# Patient Record
Sex: FEMALE | Race: WHITE | Employment: UNEMPLOYED | URBAN - METROPOLITAN AREA
[De-identification: names, ages, dates, MRNs, and addresses within clinical notes are randomized per-mention and may not be internally consistent; named-entity substitution may affect disease eponyms.]

---

## 2017-11-28 ENCOUNTER — LAB ENCOUNTER (OUTPATIENT)
Dept: LAB | Age: 27
End: 2017-11-28
Attending: OBSTETRICS & GYNECOLOGY
Payer: MEDICAID

## 2017-11-28 ENCOUNTER — OFFICE VISIT (OUTPATIENT)
Dept: OBGYN CLINIC | Facility: CLINIC | Age: 27
End: 2017-11-28

## 2017-11-28 ENCOUNTER — APPOINTMENT (OUTPATIENT)
Dept: OBGYN CLINIC | Facility: CLINIC | Age: 27
End: 2017-11-28

## 2017-11-28 VITALS
WEIGHT: 111 LBS | SYSTOLIC BLOOD PRESSURE: 110 MMHG | HEIGHT: 60 IN | DIASTOLIC BLOOD PRESSURE: 50 MMHG | BODY MASS INDEX: 21.79 KG/M2

## 2017-11-28 DIAGNOSIS — Z34.01 ENCOUNTER FOR SUPERVISION OF NORMAL FIRST PREGNANCY IN FIRST TRIMESTER: ICD-10-CM

## 2017-11-28 DIAGNOSIS — O36.80X0 PREGNANCY WITH INCONCLUSIVE FETAL VIABILITY, SINGLE OR UNSPECIFIED FETUS: ICD-10-CM

## 2017-11-28 DIAGNOSIS — Z3A.11 11 WEEKS GESTATION OF PREGNANCY: Primary | ICD-10-CM

## 2017-11-28 DIAGNOSIS — Z34.01 ENCOUNTER FOR SUPERVISION OF NORMAL FIRST PREGNANCY IN FIRST TRIMESTER: Primary | ICD-10-CM

## 2017-11-28 DIAGNOSIS — Z3A.11 11 WEEKS GESTATION OF PREGNANCY: ICD-10-CM

## 2017-11-28 PROCEDURE — 81220 CFTR GENE COM VARIANTS: CPT

## 2017-11-28 PROCEDURE — 81002 URINALYSIS NONAUTO W/O SCOPE: CPT | Performed by: OBSTETRICS & GYNECOLOGY

## 2017-11-28 PROCEDURE — 76801 OB US < 14 WKS SINGLE FETUS: CPT | Performed by: OBSTETRICS & GYNECOLOGY

## 2017-11-28 PROCEDURE — 88175 CYTOPATH C/V AUTO FLUID REDO: CPT | Performed by: OBSTETRICS & GYNECOLOGY

## 2017-11-28 PROCEDURE — 87086 URINE CULTURE/COLONY COUNT: CPT | Performed by: OBSTETRICS & GYNECOLOGY

## 2017-11-28 PROCEDURE — 87340 HEPATITIS B SURFACE AG IA: CPT

## 2017-11-28 PROCEDURE — 86780 TREPONEMA PALLIDUM: CPT

## 2017-11-28 PROCEDURE — 86901 BLOOD TYPING SEROLOGIC RH(D): CPT

## 2017-11-28 PROCEDURE — 85025 COMPLETE CBC W/AUTO DIFF WBC: CPT

## 2017-11-28 PROCEDURE — 87624 HPV HI-RISK TYP POOLED RSLT: CPT | Performed by: OBSTETRICS & GYNECOLOGY

## 2017-11-28 PROCEDURE — 86900 BLOOD TYPING SEROLOGIC ABO: CPT

## 2017-11-28 PROCEDURE — 87389 HIV-1 AG W/HIV-1&-2 AB AG IA: CPT

## 2017-11-28 PROCEDURE — 86762 RUBELLA ANTIBODY: CPT

## 2017-11-28 PROCEDURE — 0500F INITIAL PRENATAL CARE VISIT: CPT | Performed by: OBSTETRICS & GYNECOLOGY

## 2017-11-28 PROCEDURE — 86850 RBC ANTIBODY SCREEN: CPT

## 2017-12-22 ENCOUNTER — OFFICE VISIT (OUTPATIENT)
Dept: OBGYN CLINIC | Facility: CLINIC | Age: 27
End: 2017-12-22

## 2017-12-22 ENCOUNTER — APPOINTMENT (OUTPATIENT)
Dept: OBGYN CLINIC | Facility: CLINIC | Age: 27
End: 2017-12-22

## 2017-12-22 VITALS
BODY MASS INDEX: 22.19 KG/M2 | DIASTOLIC BLOOD PRESSURE: 52 MMHG | HEIGHT: 60 IN | SYSTOLIC BLOOD PRESSURE: 102 MMHG | WEIGHT: 113 LBS

## 2017-12-22 DIAGNOSIS — Z34.02 ENCOUNTER FOR SUPERVISION OF NORMAL FIRST PREGNANCY IN SECOND TRIMESTER: Primary | ICD-10-CM

## 2017-12-22 LAB
AMB EXT MYRIAD TRISOMY 13: NEGATIVE
AMB EXT MYRIAD TRISOMY 18: NEGATIVE
AMB EXT MYRIAD TRISOMY 21: NEGATIVE

## 2017-12-22 PROCEDURE — 0502F SUBSEQUENT PRENATAL CARE: CPT | Performed by: OBSTETRICS & GYNECOLOGY

## 2017-12-22 PROCEDURE — 76805 OB US >/= 14 WKS SNGL FETUS: CPT | Performed by: OBSTETRICS & GYNECOLOGY

## 2018-01-03 ENCOUNTER — TELEPHONE (OUTPATIENT)
Dept: OBGYN CLINIC | Facility: CLINIC | Age: 28
End: 2018-01-03

## 2018-01-18 DIAGNOSIS — Z3A.19 19 WEEKS GESTATION OF PREGNANCY: Primary | ICD-10-CM

## 2018-01-22 ENCOUNTER — APPOINTMENT (OUTPATIENT)
Dept: OBGYN CLINIC | Facility: CLINIC | Age: 28
End: 2018-01-22

## 2018-01-22 ENCOUNTER — TELEPHONE (OUTPATIENT)
Dept: OBGYN CLINIC | Facility: CLINIC | Age: 28
End: 2018-01-22

## 2018-01-22 ENCOUNTER — OFFICE VISIT (OUTPATIENT)
Dept: OBGYN CLINIC | Facility: CLINIC | Age: 28
End: 2018-01-22

## 2018-01-22 VITALS
WEIGHT: 118 LBS | SYSTOLIC BLOOD PRESSURE: 102 MMHG | DIASTOLIC BLOOD PRESSURE: 58 MMHG | HEIGHT: 60 IN | BODY MASS INDEX: 23.16 KG/M2

## 2018-01-22 DIAGNOSIS — Z34.02 ENCOUNTER FOR SUPERVISION OF NORMAL FIRST PREGNANCY IN SECOND TRIMESTER: Primary | ICD-10-CM

## 2018-01-22 PROCEDURE — 0502F SUBSEQUENT PRENATAL CARE: CPT | Performed by: OBSTETRICS & GYNECOLOGY

## 2018-01-22 PROCEDURE — 76805 OB US >/= 14 WKS SNGL FETUS: CPT | Performed by: OBSTETRICS & GYNECOLOGY

## 2018-01-22 NOTE — PROGRESS NOTES
Flu shot done. No diabetes in her family. Suboptimal views of the heart and spine will refer to [facility]. Sugar test in a month.

## 2018-02-20 ENCOUNTER — OFFICE VISIT (OUTPATIENT)
Dept: OBGYN CLINIC | Facility: CLINIC | Age: 28
End: 2018-02-20

## 2018-02-20 VITALS
HEIGHT: 60 IN | BODY MASS INDEX: 23.95 KG/M2 | SYSTOLIC BLOOD PRESSURE: 118 MMHG | DIASTOLIC BLOOD PRESSURE: 62 MMHG | WEIGHT: 122 LBS

## 2018-02-20 DIAGNOSIS — Z34.02 ENCOUNTER FOR SUPERVISION OF NORMAL FIRST PREGNANCY IN SECOND TRIMESTER: Primary | ICD-10-CM

## 2018-02-20 DIAGNOSIS — Z3A.23 23 WEEKS GESTATION OF PREGNANCY: ICD-10-CM

## 2018-02-20 PROCEDURE — 0502F SUBSEQUENT PRENATAL CARE: CPT | Performed by: OBSTETRICS & GYNECOLOGY

## 2018-02-20 NOTE — PROGRESS NOTES
01/29/2018 US with MFM   GA per US = 19.9 weeks   NF= 2.21 mm   CL = 3.7 cm   FHR = 152 bpm   Normal anatomy   Normal fetal growth   REYNA normal

## 2018-03-01 ENCOUNTER — LAB ENCOUNTER (OUTPATIENT)
Dept: LAB | Age: 28
End: 2018-03-01
Attending: OBSTETRICS & GYNECOLOGY
Payer: MEDICAID

## 2018-03-01 DIAGNOSIS — Z34.02 ENCOUNTER FOR SUPERVISION OF NORMAL FIRST PREGNANCY IN SECOND TRIMESTER: ICD-10-CM

## 2018-03-01 LAB
BASOPHILS # BLD AUTO: 0.03 X10(3) UL (ref 0–0.1)
BASOPHILS NFR BLD AUTO: 0.4 %
EOSINOPHIL # BLD AUTO: 0.04 X10(3) UL (ref 0–0.3)
EOSINOPHIL NFR BLD AUTO: 0.5 %
ERYTHROCYTE [DISTWIDTH] IN BLOOD BY AUTOMATED COUNT: 13.7 % (ref 11.5–16)
GLUCOSE 1H P GLC SERPL-MCNC: 118 MG/DL
HCT VFR BLD AUTO: 33.7 % (ref 34–50)
HGB BLD-MCNC: 10.9 G/DL (ref 12–16)
IMMATURE GRANULOCYTE COUNT: 0.06 X10(3) UL (ref 0–1)
IMMATURE GRANULOCYTE RATIO %: 0.7 %
LYMPHOCYTES # BLD AUTO: 1.71 X10(3) UL (ref 0.9–4)
LYMPHOCYTES NFR BLD AUTO: 20.3 %
MCH RBC QN AUTO: 28.4 PG (ref 27–33.2)
MCHC RBC AUTO-ENTMCNC: 32.3 G/DL (ref 31–37)
MCV RBC AUTO: 87.8 FL (ref 81–100)
MONOCYTES # BLD AUTO: 0.64 X10(3) UL (ref 0.1–1)
MONOCYTES NFR BLD AUTO: 7.6 %
NEUTROPHIL ABS PRELIM: 5.94 X10 (3) UL (ref 1.3–6.7)
NEUTROPHILS # BLD AUTO: 5.94 X10(3) UL (ref 1.3–6.7)
NEUTROPHILS NFR BLD AUTO: 70.5 %
PLATELET # BLD AUTO: 224 10(3)UL (ref 150–450)
RBC # BLD AUTO: 3.84 X10(6)UL (ref 3.8–5.1)
RED CELL DISTRIBUTION WIDTH-SD: 43.7 FL (ref 35.1–46.3)
WBC # BLD AUTO: 8.4 X10(3) UL (ref 4–13)

## 2018-03-01 PROCEDURE — 85025 COMPLETE CBC W/AUTO DIFF WBC: CPT

## 2018-03-01 PROCEDURE — 82950 GLUCOSE TEST: CPT

## 2018-03-20 ENCOUNTER — OFFICE VISIT (OUTPATIENT)
Dept: OBGYN CLINIC | Facility: CLINIC | Age: 28
End: 2018-03-20

## 2018-03-20 VITALS
SYSTOLIC BLOOD PRESSURE: 108 MMHG | HEIGHT: 60 IN | DIASTOLIC BLOOD PRESSURE: 60 MMHG | WEIGHT: 128 LBS | BODY MASS INDEX: 25.13 KG/M2

## 2018-03-20 DIAGNOSIS — Z3A.27 27 WEEKS GESTATION OF PREGNANCY: ICD-10-CM

## 2018-03-20 DIAGNOSIS — Z34.02 ENCOUNTER FOR SUPERVISION OF NORMAL FIRST PREGNANCY IN SECOND TRIMESTER: Primary | ICD-10-CM

## 2018-03-20 PROCEDURE — 0502F SUBSEQUENT PRENATAL CARE: CPT | Performed by: OBSTETRICS & GYNECOLOGY

## 2018-04-03 ENCOUNTER — OFFICE VISIT (OUTPATIENT)
Dept: OBGYN CLINIC | Facility: CLINIC | Age: 28
End: 2018-04-03

## 2018-04-03 ENCOUNTER — APPOINTMENT (OUTPATIENT)
Dept: LAB | Age: 28
End: 2018-04-03
Attending: OBSTETRICS & GYNECOLOGY
Payer: MEDICAID

## 2018-04-03 VITALS
SYSTOLIC BLOOD PRESSURE: 110 MMHG | HEIGHT: 60 IN | WEIGHT: 130 LBS | DIASTOLIC BLOOD PRESSURE: 64 MMHG | BODY MASS INDEX: 25.52 KG/M2

## 2018-04-03 DIAGNOSIS — Z3A.29 29 WEEKS GESTATION OF PREGNANCY: ICD-10-CM

## 2018-04-03 DIAGNOSIS — Z34.03 ENCOUNTER FOR SUPERVISION OF NORMAL FIRST PREGNANCY IN THIRD TRIMESTER: ICD-10-CM

## 2018-04-03 DIAGNOSIS — Z34.03 ENCOUNTER FOR SUPERVISION OF NORMAL FIRST PREGNANCY IN THIRD TRIMESTER: Primary | ICD-10-CM

## 2018-04-03 PROCEDURE — 0502F SUBSEQUENT PRENATAL CARE: CPT | Performed by: OBSTETRICS & GYNECOLOGY

## 2018-04-03 PROCEDURE — 87389 HIV-1 AG W/HIV-1&-2 AB AG IA: CPT | Performed by: OBSTETRICS & GYNECOLOGY

## 2018-04-17 ENCOUNTER — OFFICE VISIT (OUTPATIENT)
Dept: OBGYN CLINIC | Facility: CLINIC | Age: 28
End: 2018-04-17

## 2018-04-17 VITALS
BODY MASS INDEX: 26.11 KG/M2 | WEIGHT: 133 LBS | DIASTOLIC BLOOD PRESSURE: 60 MMHG | SYSTOLIC BLOOD PRESSURE: 102 MMHG | HEIGHT: 60 IN

## 2018-04-17 DIAGNOSIS — Z3A.31 31 WEEKS GESTATION OF PREGNANCY: ICD-10-CM

## 2018-04-17 DIAGNOSIS — Z34.03 ENCOUNTER FOR SUPERVISION OF NORMAL FIRST PREGNANCY IN THIRD TRIMESTER: Primary | ICD-10-CM

## 2018-04-17 PROCEDURE — 0502F SUBSEQUENT PRENATAL CARE: CPT | Performed by: OBSTETRICS & GYNECOLOGY

## 2018-05-01 ENCOUNTER — OFFICE VISIT (OUTPATIENT)
Dept: OBGYN CLINIC | Facility: CLINIC | Age: 28
End: 2018-05-01

## 2018-05-01 VITALS
WEIGHT: 134 LBS | BODY MASS INDEX: 26.31 KG/M2 | HEIGHT: 60 IN | SYSTOLIC BLOOD PRESSURE: 118 MMHG | DIASTOLIC BLOOD PRESSURE: 60 MMHG

## 2018-05-01 DIAGNOSIS — Z3A.33 33 WEEKS GESTATION OF PREGNANCY: ICD-10-CM

## 2018-05-01 DIAGNOSIS — Z34.03 ENCOUNTER FOR SUPERVISION OF NORMAL FIRST PREGNANCY IN THIRD TRIMESTER: Primary | ICD-10-CM

## 2018-05-01 PROCEDURE — 0502F SUBSEQUENT PRENATAL CARE: CPT | Performed by: OBSTETRICS & GYNECOLOGY

## 2018-05-03 ENCOUNTER — TELEPHONE (OUTPATIENT)
Dept: OBGYN CLINIC | Facility: CLINIC | Age: 28
End: 2018-05-03

## 2018-05-04 ENCOUNTER — APPOINTMENT (OUTPATIENT)
Dept: OBGYN CLINIC | Facility: CLINIC | Age: 28
End: 2018-05-04

## 2018-05-22 ENCOUNTER — OFFICE VISIT (OUTPATIENT)
Dept: OBGYN CLINIC | Facility: CLINIC | Age: 28
End: 2018-05-22

## 2018-05-22 VITALS
BODY MASS INDEX: 27.68 KG/M2 | DIASTOLIC BLOOD PRESSURE: 60 MMHG | HEIGHT: 60 IN | SYSTOLIC BLOOD PRESSURE: 108 MMHG | WEIGHT: 141 LBS

## 2018-05-22 DIAGNOSIS — Z3A.36 36 WEEKS GESTATION OF PREGNANCY: ICD-10-CM

## 2018-05-22 DIAGNOSIS — Z34.03 ENCOUNTER FOR SUPERVISION OF NORMAL FIRST PREGNANCY IN THIRD TRIMESTER: Primary | ICD-10-CM

## 2018-05-22 PROCEDURE — 87081 CULTURE SCREEN ONLY: CPT | Performed by: OBSTETRICS & GYNECOLOGY

## 2018-05-22 PROCEDURE — 87653 STREP B DNA AMP PROBE: CPT | Performed by: OBSTETRICS & GYNECOLOGY

## 2018-05-22 PROCEDURE — 0502F SUBSEQUENT PRENATAL CARE: CPT | Performed by: OBSTETRICS & GYNECOLOGY

## 2018-06-01 ENCOUNTER — OFFICE VISIT (OUTPATIENT)
Dept: OBGYN CLINIC | Facility: CLINIC | Age: 28
End: 2018-06-01

## 2018-06-01 VITALS
SYSTOLIC BLOOD PRESSURE: 112 MMHG | HEIGHT: 60 IN | BODY MASS INDEX: 27.88 KG/M2 | WEIGHT: 142 LBS | DIASTOLIC BLOOD PRESSURE: 60 MMHG

## 2018-06-01 DIAGNOSIS — Z3A.37 37 WEEKS GESTATION OF PREGNANCY: ICD-10-CM

## 2018-06-01 DIAGNOSIS — Z34.03 ENCOUNTER FOR SUPERVISION OF NORMAL FIRST PREGNANCY IN THIRD TRIMESTER: Primary | ICD-10-CM

## 2018-06-01 PROCEDURE — 0502F SUBSEQUENT PRENATAL CARE: CPT | Performed by: OBSTETRICS & GYNECOLOGY

## 2018-06-09 ENCOUNTER — OFFICE VISIT (OUTPATIENT)
Dept: OBGYN CLINIC | Facility: CLINIC | Age: 28
End: 2018-06-09

## 2018-06-09 VITALS
SYSTOLIC BLOOD PRESSURE: 100 MMHG | HEIGHT: 60 IN | BODY MASS INDEX: 28.07 KG/M2 | WEIGHT: 143 LBS | DIASTOLIC BLOOD PRESSURE: 62 MMHG | HEART RATE: 84 BPM

## 2018-06-09 DIAGNOSIS — Z3A.38 38 WEEKS GESTATION OF PREGNANCY: ICD-10-CM

## 2018-06-09 DIAGNOSIS — Z34.03 ENCOUNTER FOR SUPERVISION OF NORMAL FIRST PREGNANCY IN THIRD TRIMESTER: Primary | ICD-10-CM

## 2018-06-09 PROCEDURE — 0502F SUBSEQUENT PRENATAL CARE: CPT | Performed by: OBSTETRICS & GYNECOLOGY

## 2018-06-11 ENCOUNTER — TELEPHONE (OUTPATIENT)
Dept: OBGYN CLINIC | Facility: CLINIC | Age: 28
End: 2018-06-11

## 2018-06-12 ENCOUNTER — APPOINTMENT (OUTPATIENT)
Dept: OBGYN CLINIC | Facility: CLINIC | Age: 28
End: 2018-06-12

## 2018-06-16 ENCOUNTER — OFFICE VISIT (OUTPATIENT)
Dept: OBGYN CLINIC | Facility: CLINIC | Age: 28
End: 2018-06-16

## 2018-06-16 VITALS
BODY MASS INDEX: 28.07 KG/M2 | WEIGHT: 143 LBS | DIASTOLIC BLOOD PRESSURE: 58 MMHG | SYSTOLIC BLOOD PRESSURE: 100 MMHG | HEIGHT: 60 IN

## 2018-06-16 DIAGNOSIS — Z3A.39 39 WEEKS GESTATION OF PREGNANCY: ICD-10-CM

## 2018-06-16 DIAGNOSIS — Z34.03 ENCOUNTER FOR SUPERVISION OF NORMAL FIRST PREGNANCY IN THIRD TRIMESTER: Primary | ICD-10-CM

## 2018-06-16 PROCEDURE — 0502F SUBSEQUENT PRENATAL CARE: CPT | Performed by: OBSTETRICS & GYNECOLOGY

## 2018-06-18 ENCOUNTER — HOSPITAL ENCOUNTER (INPATIENT)
Facility: HOSPITAL | Age: 28
LOS: 3 days | Discharge: HOME | End: 2018-06-21
Attending: OBSTETRICS & GYNECOLOGY | Admitting: OBSTETRICS & GYNECOLOGY
Payer: MEDICAID

## 2018-06-18 ENCOUNTER — OFFICE VISIT (OUTPATIENT)
Dept: OBGYN CLINIC | Facility: CLINIC | Age: 28
End: 2018-06-18

## 2018-06-18 VITALS
HEIGHT: 60 IN | DIASTOLIC BLOOD PRESSURE: 60 MMHG | SYSTOLIC BLOOD PRESSURE: 128 MMHG | WEIGHT: 142 LBS | BODY MASS INDEX: 27.88 KG/M2

## 2018-06-18 DIAGNOSIS — Z34.03 ENCOUNTER FOR SUPERVISION OF NORMAL FIRST PREGNANCY IN THIRD TRIMESTER: Primary | ICD-10-CM

## 2018-06-18 DIAGNOSIS — O48.0 POST-TERM PREGNANCY, 40-42 WEEKS OF GESTATION: ICD-10-CM

## 2018-06-18 DIAGNOSIS — Z3A.40 40 WEEKS GESTATION OF PREGNANCY: ICD-10-CM

## 2018-06-18 PROBLEM — Z34.90 PREGNANCY: Status: ACTIVE | Noted: 2018-06-18

## 2018-06-18 PROBLEM — Z34.90 PREGNANCY (HCC): Status: ACTIVE | Noted: 2018-06-18

## 2018-06-18 PROCEDURE — 59025 FETAL NON-STRESS TEST: CPT | Performed by: OBSTETRICS & GYNECOLOGY

## 2018-06-18 PROCEDURE — 0502F SUBSEQUENT PRENATAL CARE: CPT | Performed by: OBSTETRICS & GYNECOLOGY

## 2018-06-18 RX ORDER — TERBUTALINE SULFATE 1 MG/ML
0.25 INJECTION, SOLUTION SUBCUTANEOUS AS NEEDED
Status: DISCONTINUED | OUTPATIENT
Start: 2018-06-18 | End: 2018-06-19 | Stop reason: HOSPADM

## 2018-06-18 RX ORDER — SODIUM CHLORIDE, SODIUM LACTATE, POTASSIUM CHLORIDE, CALCIUM CHLORIDE 600; 310; 30; 20 MG/100ML; MG/100ML; MG/100ML; MG/100ML
INJECTION, SOLUTION INTRAVENOUS CONTINUOUS
Status: DISCONTINUED | OUTPATIENT
Start: 2018-06-18 | End: 2018-06-19 | Stop reason: HOSPADM

## 2018-06-18 RX ORDER — NALBUPHINE HCL 10 MG/ML
2.5 AMPUL (ML) INJECTION
Status: DISCONTINUED | OUTPATIENT
Start: 2018-06-18 | End: 2018-06-19

## 2018-06-18 RX ORDER — EPHEDRINE SULFATE/0.9% NACL/PF 25 MG/5 ML
5 SYRINGE (ML) INTRAVENOUS AS NEEDED
Status: DISCONTINUED | OUTPATIENT
Start: 2018-06-18 | End: 2018-06-19

## 2018-06-18 RX ORDER — DEXTROSE, SODIUM CHLORIDE, SODIUM LACTATE, POTASSIUM CHLORIDE, AND CALCIUM CHLORIDE 5; .6; .31; .03; .02 G/100ML; G/100ML; G/100ML; G/100ML; G/100ML
INJECTION, SOLUTION INTRAVENOUS AS NEEDED
Status: DISCONTINUED | OUTPATIENT
Start: 2018-06-18 | End: 2018-06-19 | Stop reason: HOSPADM

## 2018-06-18 RX ORDER — IBUPROFEN 600 MG/1
600 TABLET ORAL ONCE AS NEEDED
Status: DISCONTINUED | OUTPATIENT
Start: 2018-06-18 | End: 2018-06-19 | Stop reason: HOSPADM

## 2018-06-18 RX ORDER — TRISODIUM CITRATE DIHYDRATE AND CITRIC ACID MONOHYDRATE 500; 334 MG/5ML; MG/5ML
30 SOLUTION ORAL AS NEEDED
Status: DISCONTINUED | OUTPATIENT
Start: 2018-06-18 | End: 2018-06-19 | Stop reason: HOSPADM

## 2018-06-19 PROCEDURE — 0KQM0ZZ REPAIR PERINEUM MUSCLE, OPEN APPROACH: ICD-10-PCS | Performed by: OBSTETRICS & GYNECOLOGY

## 2018-06-19 PROCEDURE — 59409 OBSTETRICAL CARE: CPT | Performed by: OBSTETRICS & GYNECOLOGY

## 2018-06-19 RX ORDER — BISACODYL 10 MG
10 SUPPOSITORY, RECTAL RECTAL ONCE AS NEEDED
Status: ACTIVE | OUTPATIENT
Start: 2018-06-19 | End: 2018-06-19

## 2018-06-19 RX ORDER — IBUPROFEN 600 MG/1
600 TABLET ORAL EVERY 6 HOURS SCHEDULED
Status: DISCONTINUED | OUTPATIENT
Start: 2018-06-19 | End: 2018-06-21

## 2018-06-19 RX ORDER — ACETAMINOPHEN 325 MG/1
650 TABLET ORAL EVERY 6 HOURS PRN
Status: DISCONTINUED | OUTPATIENT
Start: 2018-06-19 | End: 2018-06-21

## 2018-06-19 RX ORDER — ZOLPIDEM TARTRATE 5 MG/1
5 TABLET ORAL NIGHTLY PRN
Status: DISCONTINUED | OUTPATIENT
Start: 2018-06-19 | End: 2018-06-21

## 2018-06-19 RX ORDER — ACETAMINOPHEN 500 MG
TABLET ORAL
Status: DISPENSED
Start: 2018-06-19 | End: 2018-06-19

## 2018-06-19 RX ORDER — ACETAMINOPHEN 500 MG
1000 TABLET ORAL ONCE
Status: COMPLETED | OUTPATIENT
Start: 2018-06-19 | End: 2018-06-19

## 2018-06-19 RX ORDER — DOCUSATE SODIUM 100 MG/1
100 CAPSULE, LIQUID FILLED ORAL
Status: DISCONTINUED | OUTPATIENT
Start: 2018-06-19 | End: 2018-06-21

## 2018-06-19 RX ORDER — SIMETHICONE 80 MG
80 TABLET,CHEWABLE ORAL 3 TIMES DAILY PRN
Status: DISCONTINUED | OUTPATIENT
Start: 2018-06-19 | End: 2018-06-21

## 2018-06-19 NOTE — PROGRESS NOTES
Pt transferred to Mother Baby room 1107 in stable condition. Report given to [facility]. Infant transferred with mother in stable condition.

## 2018-06-19 NOTE — L&D DELIVERY NOTE
Edi Ely, Girl  [[id number]]    Labor Events     labor?:  No   steroids?:  None  Antibiotics received during labor?:  No  Rupture date/time:  2018 0740     Rupture type:  AROM  Fluid color:  Clear  Induction:  None  Intrapartum & labor cry; hypoventilation Good, crying               1 Minute:   5 Minute:   10 Minute:   15 Minute:   20 Minute:     Skin color:   Heart rate:   Reflex irritability:   Muscle tone:   Respiratory effort:    Total:            1    2    2    2    2    9

## 2018-06-19 NOTE — PROGRESS NOTES
ADMISSION NOTE  Patient admitted to room in stable condition via: [address] to room, Safety precautions initiated. Bed in low position, call light in reach.  Report received and ID Bands checked & verified with: L&D RN  Plan of care and safety

## 2018-06-19 NOTE — PROGRESS NOTES
Pt is a 33year old female admitted to 104/104-A. Patient presents with:  Contractions: Patient c/o contractions every 7-8 minutes that feel stronger than before, patient denies leaking fluid or vaginal bleeding. +fetal movement.      Pt is  40w2d

## 2018-06-20 NOTE — PROGRESS NOTES
[facility]  Post-Partum Progress Note    Kassi Adams Patient Status:  Inpatient    1990 MRN [id number]   [facility] 1SW-J Attending Carley Hope MD   Hosp Day # 2 PCP None Pcp     SUBJECTIVE:    Postpartum Day 1:    The mariano

## 2018-06-20 NOTE — CM/SW NOTE
CM met with pt and reviewed insurance and PCP with pt. Pt stated that she has [employer] and will need infant added to medicaid. [employer] called and they will follow up with pt to do infant add on to medicaid.  CM informed pt that the stated

## 2018-06-21 VITALS
TEMPERATURE: 98 F | WEIGHT: 142 LBS | BODY MASS INDEX: 27.88 KG/M2 | HEART RATE: 62 BPM | HEIGHT: 60 IN | SYSTOLIC BLOOD PRESSURE: 107 MMHG | DIASTOLIC BLOOD PRESSURE: 66 MMHG | RESPIRATION RATE: 18 BRPM | OXYGEN SATURATION: 99 %

## 2018-06-21 NOTE — H&P
[address]    PATIENT'S NAME: Saskia Silva   ATTENDING PHYSICIAN: Tona Goodman M.D.    PATIENT ACCOUNT#:   [de-identified]    LOCATION:  [address] [place] [address]  MEDICAL RECORD #:   [id number]       YOB: 1990  ADMISSION DATE:       06/18/201

## 2018-06-21 NOTE — PLAN OF CARE
POSTPARTUM    • Long Term Goal:Experiences normal postpartum course Completed    • Establishment of adequate milk supply with medication/procedure interruptions Completed    • Appropriate maternal -  bonding Completed

## 2018-06-21 NOTE — PROGRESS NOTES
[facility]  Post-Partum Progress Note    Nidhi Pollen Patient Status:  Inpatient    1990 MRN [id number]   [facility] 1SW-J Attending Mago Brandt MD   Hosp Day # 3 PCP None Pcp     SUBJECTIVE:    Postpartum Day 2    The cherie

## 2018-06-27 ENCOUNTER — TELEPHONE (OUTPATIENT)
Dept: OBGYN UNIT | Facility: HOSPITAL | Age: 28
End: 2018-06-27

## 2018-06-27 NOTE — PROGRESS NOTES
Outgoing cradle call completed. Mom reports that she and infant are doing well. Has had pediatrician F/U visit. Has PP F/U visit scheduled. No complaints of PPB or PPD. Reviewed basic infant and self care; verbalizes understanding.   Encouraged to foll

## 2018-06-28 ENCOUNTER — TELEPHONE (OUTPATIENT)
Dept: OBGYN CLINIC | Facility: CLINIC | Age: 28
End: 2018-06-28

## 2018-06-28 ENCOUNTER — OFFICE VISIT (OUTPATIENT)
Dept: OBGYN CLINIC | Facility: CLINIC | Age: 28
End: 2018-06-28

## 2018-06-28 VITALS
SYSTOLIC BLOOD PRESSURE: 118 MMHG | DIASTOLIC BLOOD PRESSURE: 66 MMHG | HEIGHT: 60 IN | BODY MASS INDEX: 24.94 KG/M2 | TEMPERATURE: 101 F | WEIGHT: 127 LBS

## 2018-06-28 PROBLEM — Z34.02 ENCOUNTER FOR SUPERVISION OF NORMAL FIRST PREGNANCY IN SECOND TRIMESTER: Status: RESOLVED | Noted: 2017-12-22 | Resolved: 2018-06-28

## 2018-06-28 PROBLEM — Z34.90 PREGNANCY (HCC): Status: RESOLVED | Noted: 2018-06-18 | Resolved: 2018-06-28

## 2018-06-28 PROBLEM — Z34.02 ENCOUNTER FOR SUPERVISION OF NORMAL FIRST PREGNANCY IN SECOND TRIMESTER (HCC): Status: RESOLVED | Noted: 2017-12-22 | Resolved: 2018-06-28

## 2018-06-28 PROBLEM — Z34.90 PREGNANCY: Status: RESOLVED | Noted: 2018-06-18 | Resolved: 2018-06-28

## 2018-06-28 PROCEDURE — 99212 OFFICE O/P EST SF 10 MIN: CPT | Performed by: OBSTETRICS & GYNECOLOGY

## 2018-06-28 RX ORDER — CEPHALEXIN 500 MG/1
500 CAPSULE ORAL 3 TIMES DAILY
Qty: 30 CAPSULE | Refills: 0 | Status: SHIPPED | OUTPATIENT
Start: 2018-06-28 | End: 2020-09-04 | Stop reason: ALTCHOICE

## 2018-06-28 NOTE — PROGRESS NOTES
She is breast-feeding. Had a fever last night. Came in and has a her of the left breast with obvious mastitis. Will call in antibiotics for her encouraged to continue breast-feeding.

## 2018-07-09 ENCOUNTER — TELEPHONE (OUTPATIENT)
Dept: OBGYN CLINIC | Facility: CLINIC | Age: 28
End: 2018-07-09

## 2018-07-31 ENCOUNTER — POSTPARTUM (OUTPATIENT)
Dept: OBGYN CLINIC | Facility: CLINIC | Age: 28
End: 2018-07-31
Payer: MEDICAID

## 2018-07-31 VITALS
SYSTOLIC BLOOD PRESSURE: 120 MMHG | HEIGHT: 60 IN | DIASTOLIC BLOOD PRESSURE: 60 MMHG | WEIGHT: 127 LBS | HEART RATE: 88 BPM | RESPIRATION RATE: 16 BRPM | BODY MASS INDEX: 24.94 KG/M2

## 2018-07-31 NOTE — PROGRESS NOTES
MARISOL Adams is a 33year old female  here for 6 week post-partum visit. Patient delivered a  female infant on 18 via . Patient desires nothing for contraception. Patient is breast feeding.    Patient denies symptoms of depressi lesions  Vagina:  Normal appearance without lesions, no abnormal discharge  Cervix:  Normal without tenderness on motion  Uterus: normal in size, contour, position, mobility, without tenderness  Adnexa: normal without masses or tenderness  Perineum: well h

## 2020-08-04 ENCOUNTER — INITIAL PRENATAL (OUTPATIENT)
Dept: OBGYN CLINIC | Facility: CLINIC | Age: 30
End: 2020-08-04
Payer: MEDICAID

## 2020-08-04 VITALS
BODY MASS INDEX: 20.37 KG/M2 | SYSTOLIC BLOOD PRESSURE: 100 MMHG | HEIGHT: 63 IN | WEIGHT: 115 LBS | DIASTOLIC BLOOD PRESSURE: 60 MMHG | HEART RATE: 100 BPM

## 2020-08-04 DIAGNOSIS — Z3A.15 15 WEEKS GESTATION OF PREGNANCY: ICD-10-CM

## 2020-08-04 DIAGNOSIS — Z36.89 SCREENING, ANTENATAL, FOR FETAL ANATOMIC SURVEY: ICD-10-CM

## 2020-08-04 DIAGNOSIS — Z34.82 PRENATAL CARE, SUBSEQUENT PREGNANCY IN SECOND TRIMESTER: Primary | ICD-10-CM

## 2020-08-04 PROCEDURE — 3008F BODY MASS INDEX DOCD: CPT | Performed by: OBSTETRICS & GYNECOLOGY

## 2020-08-04 PROCEDURE — 0500F INITIAL PRENATAL CARE VISIT: CPT | Performed by: OBSTETRICS & GYNECOLOGY

## 2020-08-04 PROCEDURE — 87491 CHLMYD TRACH DNA AMP PROBE: CPT | Performed by: OBSTETRICS & GYNECOLOGY

## 2020-08-04 PROCEDURE — 87591 N.GONORRHOEAE DNA AMP PROB: CPT | Performed by: OBSTETRICS & GYNECOLOGY

## 2020-08-04 PROCEDURE — 87086 URINE CULTURE/COLONY COUNT: CPT | Performed by: OBSTETRICS & GYNECOLOGY

## 2020-08-04 PROCEDURE — 3078F DIAST BP <80 MM HG: CPT | Performed by: OBSTETRICS & GYNECOLOGY

## 2020-08-04 PROCEDURE — 3074F SYST BP LT 130 MM HG: CPT | Performed by: OBSTETRICS & GYNECOLOGY

## 2020-08-04 NOTE — PROGRESS NOTES
1st OB  - denies h/o HSV, blood transfusion, hepatitis  - desires cf DNA - done today  - u/s next visit

## 2020-08-05 ENCOUNTER — TELEPHONE (OUTPATIENT)
Dept: OBGYN CLINIC | Facility: CLINIC | Age: 30
End: 2020-08-05

## 2020-08-05 LAB
C TRACH DNA SPEC QL NAA+PROBE: NEGATIVE
N GONORRHOEA DNA SPEC QL NAA+PROBE: NEGATIVE

## 2020-08-05 NOTE — TELEPHONE ENCOUNTER
Submitted prior auth for 20 week scan via [employer] webportal; [id number] approved  [id number]

## 2020-08-08 ENCOUNTER — LAB ENCOUNTER (OUTPATIENT)
Dept: LAB | Age: 30
End: 2020-08-08
Attending: OBSTETRICS & GYNECOLOGY
Payer: MEDICAID

## 2020-08-08 DIAGNOSIS — Z34.82 PRENATAL CARE, SUBSEQUENT PREGNANCY IN SECOND TRIMESTER: ICD-10-CM

## 2020-08-08 LAB
ANTIBODY SCREEN: NEGATIVE
BASOPHILS # BLD AUTO: 0.03 X10(3) UL (ref 0–0.2)
BASOPHILS NFR BLD AUTO: 0.5 %
DEPRECATED RDW RBC AUTO: 42.7 FL (ref 35.1–46.3)
EOSINOPHIL # BLD AUTO: 0.04 X10(3) UL (ref 0–0.7)
EOSINOPHIL NFR BLD AUTO: 0.6 %
ERYTHROCYTE [DISTWIDTH] IN BLOOD BY AUTOMATED COUNT: 13.6 % (ref 11–15)
HBV SURFACE AG SER-ACNC: <0.1 [IU]/L
HBV SURFACE AG SERPL QL IA: NONREACTIVE
HCT VFR BLD AUTO: 35.9 % (ref 35–48)
HGB BLD-MCNC: 11.2 G/DL (ref 12–16)
IMM GRANULOCYTES # BLD AUTO: 0.04 X10(3) UL (ref 0–1)
IMM GRANULOCYTES NFR BLD: 0.6 %
LYMPHOCYTES # BLD AUTO: 1.82 X10(3) UL (ref 1–4)
LYMPHOCYTES NFR BLD AUTO: 28.4 %
MCH RBC QN AUTO: 27.4 PG (ref 26–34)
MCHC RBC AUTO-ENTMCNC: 31.2 G/DL (ref 31–37)
MCV RBC AUTO: 87.8 FL (ref 80–100)
MONOCYTES # BLD AUTO: 0.49 X10(3) UL (ref 0.1–1)
MONOCYTES NFR BLD AUTO: 7.6 %
NEUTROPHILS # BLD AUTO: 3.99 X10 (3) UL (ref 1.5–7.7)
NEUTROPHILS # BLD AUTO: 3.99 X10(3) UL (ref 1.5–7.7)
NEUTROPHILS NFR BLD AUTO: 62.3 %
PLATELET # BLD AUTO: 245 10(3)UL (ref 150–450)
RBC # BLD AUTO: 4.09 X10(6)UL (ref 3.8–5.3)
RH BLOOD TYPE: POSITIVE
RUBV IGG SER QL: POSITIVE
RUBV IGG SER-ACNC: >500 IU/ML (ref 10–?)
T PALLIDUM AB SER QL IA: NONREACTIVE
WBC # BLD AUTO: 6.4 X10(3) UL (ref 4–11)

## 2020-08-08 PROCEDURE — 87340 HEPATITIS B SURFACE AG IA: CPT

## 2020-08-08 PROCEDURE — 85025 COMPLETE CBC W/AUTO DIFF WBC: CPT

## 2020-08-08 PROCEDURE — 87389 HIV-1 AG W/HIV-1&-2 AB AG IA: CPT

## 2020-08-08 PROCEDURE — 86780 TREPONEMA PALLIDUM: CPT

## 2020-08-08 PROCEDURE — 36415 COLL VENOUS BLD VENIPUNCTURE: CPT

## 2020-08-08 PROCEDURE — 86850 RBC ANTIBODY SCREEN: CPT

## 2020-08-08 PROCEDURE — 86900 BLOOD TYPING SEROLOGIC ABO: CPT

## 2020-08-08 PROCEDURE — 86762 RUBELLA ANTIBODY: CPT

## 2020-08-08 PROCEDURE — 86901 BLOOD TYPING SEROLOGIC RH(D): CPT

## 2020-08-25 ENCOUNTER — TELEPHONE (OUTPATIENT)
Dept: OBGYN CLINIC | Facility: CLINIC | Age: 30
End: 2020-08-25

## 2020-08-25 NOTE — PROGRESS NOTES
[facility] prenatal screening = negative for trisomy 15, 25 and 21. Predicted fetal sex available per paper record.

## 2020-08-25 NOTE — TELEPHONE ENCOUNTER
Patient aware of trisomy 13,18 and 21 results are negative from her [place] prenatal screen. Patient would like to  a card for a gender reveal today at the . Understanding verbalized.

## 2020-09-04 ENCOUNTER — ROUTINE PRENATAL (OUTPATIENT)
Dept: OBGYN CLINIC | Facility: CLINIC | Age: 30
End: 2020-09-04
Payer: MEDICAID

## 2020-09-04 VITALS
DIASTOLIC BLOOD PRESSURE: 60 MMHG | HEIGHT: 63 IN | WEIGHT: 120 LBS | BODY MASS INDEX: 21.26 KG/M2 | SYSTOLIC BLOOD PRESSURE: 102 MMHG

## 2020-09-04 DIAGNOSIS — Z34.82 PRENATAL CARE, SUBSEQUENT PREGNANCY IN SECOND TRIMESTER: ICD-10-CM

## 2020-09-04 DIAGNOSIS — Z36.89 SCREENING, ANTENATAL, FOR FETAL ANATOMIC SURVEY: ICD-10-CM

## 2020-09-04 DIAGNOSIS — Z3A.19 19 WEEKS GESTATION OF PREGNANCY: ICD-10-CM

## 2020-09-04 DIAGNOSIS — Z34.82 PRENATAL CARE, SUBSEQUENT PREGNANCY, SECOND TRIMESTER: Primary | ICD-10-CM

## 2020-09-04 LAB
GLUCOSE (URINE DIPSTICK): NEGATIVE MG/DL
MULTISTIX LOT#: NORMAL NUMERIC
PROTEIN (URINE DIPSTICK): NEGATIVE MG/DL

## 2020-09-04 PROCEDURE — 76805 OB US >/= 14 WKS SNGL FETUS: CPT | Performed by: OBSTETRICS & GYNECOLOGY

## 2020-09-04 PROCEDURE — 3074F SYST BP LT 130 MM HG: CPT | Performed by: OBSTETRICS & GYNECOLOGY

## 2020-09-04 PROCEDURE — 0502F SUBSEQUENT PRENATAL CARE: CPT | Performed by: OBSTETRICS & GYNECOLOGY

## 2020-09-04 PROCEDURE — 81002 URINALYSIS NONAUTO W/O SCOPE: CPT | Performed by: OBSTETRICS & GYNECOLOGY

## 2020-09-04 PROCEDURE — 3078F DIAST BP <80 MM HG: CPT | Performed by: OBSTETRICS & GYNECOLOGY

## 2020-09-04 PROCEDURE — 3008F BODY MASS INDEX DOCD: CPT | Performed by: OBSTETRICS & GYNECOLOGY

## 2020-10-10 ENCOUNTER — ROUTINE PRENATAL (OUTPATIENT)
Dept: OBGYN CLINIC | Facility: CLINIC | Age: 30
End: 2020-10-10
Payer: MEDICAID

## 2020-10-10 VITALS
SYSTOLIC BLOOD PRESSURE: 98 MMHG | DIASTOLIC BLOOD PRESSURE: 54 MMHG | HEIGHT: 63 IN | WEIGHT: 127 LBS | BODY MASS INDEX: 22.5 KG/M2

## 2020-10-10 DIAGNOSIS — Z34.82 PRENATAL CARE, SUBSEQUENT PREGNANCY, SECOND TRIMESTER: Primary | ICD-10-CM

## 2020-10-10 DIAGNOSIS — Z3A.25 25 WEEKS GESTATION OF PREGNANCY: ICD-10-CM

## 2020-10-10 PROCEDURE — 3074F SYST BP LT 130 MM HG: CPT | Performed by: OBSTETRICS & GYNECOLOGY

## 2020-10-10 PROCEDURE — 0502F SUBSEQUENT PRENATAL CARE: CPT | Performed by: OBSTETRICS & GYNECOLOGY

## 2020-10-10 PROCEDURE — 3008F BODY MASS INDEX DOCD: CPT | Performed by: OBSTETRICS & GYNECOLOGY

## 2020-10-10 PROCEDURE — 3078F DIAST BP <80 MM HG: CPT | Performed by: OBSTETRICS & GYNECOLOGY

## 2020-10-10 PROCEDURE — 81002 URINALYSIS NONAUTO W/O SCOPE: CPT | Performed by: OBSTETRICS & GYNECOLOGY

## 2020-10-31 ENCOUNTER — LAB ENCOUNTER (OUTPATIENT)
Dept: LAB | Age: 30
End: 2020-10-31
Attending: OBSTETRICS & GYNECOLOGY
Payer: MEDICAID

## 2020-10-31 DIAGNOSIS — Z34.82 PRENATAL CARE, SUBSEQUENT PREGNANCY, SECOND TRIMESTER: ICD-10-CM

## 2020-10-31 PROCEDURE — 85025 COMPLETE CBC W/AUTO DIFF WBC: CPT

## 2020-10-31 PROCEDURE — 82950 GLUCOSE TEST: CPT

## 2020-10-31 PROCEDURE — 36415 COLL VENOUS BLD VENIPUNCTURE: CPT

## 2020-11-02 ENCOUNTER — ROUTINE PRENATAL (OUTPATIENT)
Dept: OBGYN CLINIC | Facility: CLINIC | Age: 30
End: 2020-11-02
Payer: MEDICAID

## 2020-11-02 VITALS
HEIGHT: 63 IN | BODY MASS INDEX: 23.39 KG/M2 | SYSTOLIC BLOOD PRESSURE: 98 MMHG | WEIGHT: 132 LBS | DIASTOLIC BLOOD PRESSURE: 52 MMHG

## 2020-11-02 DIAGNOSIS — Z28.21 TETANUS, DIPHTHERIA, AND ACELLULAR PERTUSSIS (TDAP) VACCINATION DECLINED: ICD-10-CM

## 2020-11-02 DIAGNOSIS — Z28.21 INFLUENZA VACCINATION DECLINED: ICD-10-CM

## 2020-11-02 DIAGNOSIS — O99.013 ANEMIA AFFECTING PREGNANCY IN THIRD TRIMESTER: Primary | ICD-10-CM

## 2020-11-02 PROCEDURE — 81002 URINALYSIS NONAUTO W/O SCOPE: CPT | Performed by: OBSTETRICS & GYNECOLOGY

## 2020-11-02 PROCEDURE — 3008F BODY MASS INDEX DOCD: CPT | Performed by: OBSTETRICS & GYNECOLOGY

## 2020-11-02 PROCEDURE — 3078F DIAST BP <80 MM HG: CPT | Performed by: OBSTETRICS & GYNECOLOGY

## 2020-11-02 PROCEDURE — 0502F SUBSEQUENT PRENATAL CARE: CPT | Performed by: OBSTETRICS & GYNECOLOGY

## 2020-11-02 PROCEDURE — 3074F SYST BP LT 130 MM HG: CPT | Performed by: OBSTETRICS & GYNECOLOGY

## 2020-11-02 RX ORDER — FERROUS SULFATE 325(65) MG
TABLET ORAL
Qty: 90 TABLET | Refills: 3 | Status: SHIPPED | OUTPATIENT
Start: 2020-11-02 | End: 2020-12-16

## 2020-11-02 NOTE — PROGRESS NOTES
CARIDAD  +FM. Doing well. 35year old  at 28w2d   DREW 21  O+  cfDNA neg, male  Anatomy US normal  1 hr GTT 99    Anemia  -Hb 10.7 (10/31/2020)   -advise extra iron.  Rx sent   -recheck CBC in 2-3 weeks    3rd trimester HIV counseling done & orde

## 2020-11-16 ENCOUNTER — ROUTINE PRENATAL (OUTPATIENT)
Dept: OBGYN CLINIC | Facility: CLINIC | Age: 30
End: 2020-11-16
Payer: MEDICAID

## 2020-11-16 VITALS
HEART RATE: 71 BPM | DIASTOLIC BLOOD PRESSURE: 50 MMHG | SYSTOLIC BLOOD PRESSURE: 100 MMHG | HEIGHT: 63 IN | BODY MASS INDEX: 23.57 KG/M2 | WEIGHT: 133 LBS

## 2020-11-16 DIAGNOSIS — Z3A.30 30 WEEKS GESTATION OF PREGNANCY: ICD-10-CM

## 2020-11-16 DIAGNOSIS — Z34.83 PRENATAL CARE, SUBSEQUENT PREGNANCY IN THIRD TRIMESTER: Primary | ICD-10-CM

## 2020-11-16 PROCEDURE — 3008F BODY MASS INDEX DOCD: CPT | Performed by: OBSTETRICS & GYNECOLOGY

## 2020-11-16 PROCEDURE — 0502F SUBSEQUENT PRENATAL CARE: CPT | Performed by: OBSTETRICS & GYNECOLOGY

## 2020-11-16 PROCEDURE — 3078F DIAST BP <80 MM HG: CPT | Performed by: OBSTETRICS & GYNECOLOGY

## 2020-11-16 PROCEDURE — 3074F SYST BP LT 130 MM HG: CPT | Performed by: OBSTETRICS & GYNECOLOGY

## 2020-11-16 PROCEDURE — 81002 URINALYSIS NONAUTO W/O SCOPE: CPT | Performed by: OBSTETRICS & GYNECOLOGY

## 2020-11-16 NOTE — PROGRESS NOTES
No complaints  - cfDNA neg male  - declines TDAP and flu shot  1.  Anemia  - iron daily  - reminded to have CBC and HIV test done

## 2020-11-21 ENCOUNTER — LAB ENCOUNTER (OUTPATIENT)
Dept: LAB | Facility: HOSPITAL | Age: 30
End: 2020-11-21
Attending: OBSTETRICS & GYNECOLOGY
Payer: MEDICAID

## 2020-11-21 DIAGNOSIS — O99.013 ANEMIA AFFECTING PREGNANCY IN THIRD TRIMESTER: ICD-10-CM

## 2020-11-21 PROCEDURE — 36415 COLL VENOUS BLD VENIPUNCTURE: CPT

## 2020-11-21 PROCEDURE — 85025 COMPLETE CBC W/AUTO DIFF WBC: CPT

## 2020-11-21 PROCEDURE — 82728 ASSAY OF FERRITIN: CPT

## 2020-11-21 PROCEDURE — 87389 HIV-1 AG W/HIV-1&-2 AB AG IA: CPT

## 2020-11-23 PROBLEM — D50.9 IRON DEFICIENCY ANEMIA OF PREGNANCY: Status: ACTIVE | Noted: 2020-11-02

## 2020-11-23 PROBLEM — D50.9 IRON DEFICIENCY ANEMIA OF PREGNANCY (HCC): Status: ACTIVE | Noted: 2020-11-02

## 2020-11-23 PROBLEM — O99.019 IRON DEFICIENCY ANEMIA OF PREGNANCY: Status: ACTIVE | Noted: 2020-11-02

## 2020-11-23 PROBLEM — O99.019 IRON DEFICIENCY ANEMIA OF PREGNANCY (HCC): Status: ACTIVE | Noted: 2020-11-02

## 2020-11-27 ENCOUNTER — TELEPHONE (OUTPATIENT)
Dept: OBGYN CLINIC | Facility: CLINIC | Age: 30
End: 2020-11-27

## 2020-11-27 NOTE — PROGRESS NOTES
Patient aware of CBC and HIV results and recommendations for IV iron. Patient would like to discuss with Dr Adelaide Marc at her appointment 12/2/2020. IV iron request faxed to cancer center. Patient verbalized understanding.

## 2020-11-27 NOTE — PROGRESS NOTES
Patient aware of CBC and HIV results and recommendations for IV iron. Patient would like to discuss with Dr Caleb Rebolledo at her appointment 12/2/2020. IV iron request faxed to cancer center. Patient verbalized understanding.

## 2020-12-01 DIAGNOSIS — O99.019 IRON DEFICIENCY ANEMIA OF PREGNANCY: Primary | ICD-10-CM

## 2020-12-01 DIAGNOSIS — D50.9 IRON DEFICIENCY ANEMIA OF PREGNANCY: Primary | ICD-10-CM

## 2020-12-01 RX ORDER — METHYLPREDNISOLONE SODIUM SUCCINATE 125 MG/2ML
125 INJECTION, POWDER, LYOPHILIZED, FOR SOLUTION INTRAMUSCULAR; INTRAVENOUS ONCE
Status: CANCELLED | OUTPATIENT
Start: 2020-12-01

## 2020-12-01 RX ORDER — FAMOTIDINE 10 MG/ML
20 INJECTION, SOLUTION INTRAVENOUS ONCE
Status: CANCELLED | OUTPATIENT
Start: 2020-12-01

## 2020-12-01 RX ORDER — DIPHENHYDRAMINE HYDROCHLORIDE 50 MG/ML
25 INJECTION INTRAMUSCULAR; INTRAVENOUS ONCE
Status: CANCELLED | OUTPATIENT
Start: 2020-12-01

## 2020-12-01 RX ORDER — MEPERIDINE HYDROCHLORIDE 25 MG/ML
25 INJECTION INTRAMUSCULAR; INTRAVENOUS; SUBCUTANEOUS ONCE
Status: CANCELLED | OUTPATIENT
Start: 2020-12-01

## 2020-12-01 RX ORDER — METHYLPREDNISOLONE SODIUM SUCCINATE 40 MG/ML
40 INJECTION, POWDER, LYOPHILIZED, FOR SOLUTION INTRAMUSCULAR; INTRAVENOUS ONCE
Status: CANCELLED | OUTPATIENT
Start: 2020-12-01

## 2020-12-02 ENCOUNTER — ROUTINE PRENATAL (OUTPATIENT)
Dept: OBGYN CLINIC | Facility: CLINIC | Age: 30
End: 2020-12-02
Payer: MEDICAID

## 2020-12-02 VITALS
HEIGHT: 63 IN | SYSTOLIC BLOOD PRESSURE: 104 MMHG | BODY MASS INDEX: 24.27 KG/M2 | WEIGHT: 137 LBS | DIASTOLIC BLOOD PRESSURE: 52 MMHG

## 2020-12-02 DIAGNOSIS — Z3A.32 32 WEEKS GESTATION OF PREGNANCY: ICD-10-CM

## 2020-12-02 DIAGNOSIS — Z34.83 PRENATAL CARE, SUBSEQUENT PREGNANCY, THIRD TRIMESTER: Primary | ICD-10-CM

## 2020-12-02 PROCEDURE — 3078F DIAST BP <80 MM HG: CPT | Performed by: OBSTETRICS & GYNECOLOGY

## 2020-12-02 PROCEDURE — 81002 URINALYSIS NONAUTO W/O SCOPE: CPT | Performed by: OBSTETRICS & GYNECOLOGY

## 2020-12-02 PROCEDURE — 3008F BODY MASS INDEX DOCD: CPT | Performed by: OBSTETRICS & GYNECOLOGY

## 2020-12-02 PROCEDURE — 0502F SUBSEQUENT PRENATAL CARE: CPT | Performed by: OBSTETRICS & GYNECOLOGY

## 2020-12-02 PROCEDURE — 3074F SYST BP LT 130 MM HG: CPT | Performed by: OBSTETRICS & GYNECOLOGY

## 2020-12-02 NOTE — PROGRESS NOTES
No complaints  - cfDNA neg male  - declines TDAP and flu shot  1.  Anemia  - HB dropped 10.7 to 10.4, ferritin 2.8 - will schedule IV iron

## 2020-12-09 ENCOUNTER — TELEPHONE (OUTPATIENT)
Dept: OBGYN CLINIC | Facility: CLINIC | Age: 30
End: 2020-12-09

## 2020-12-09 NOTE — TELEPHONE ENCOUNTER
Spoke with Patient. Number given to call an schedule IV iron. Order placed and authorization approved. Left message with Infusion center to clarify everything was received. Patient will call tomorrow. Understanding verbalized.

## 2020-12-11 NOTE — TELEPHONE ENCOUNTER
Spoke with patient. She was able to schedule IV iron without difficulty. First appointment 12/16/2020. Understanding verbalized.

## 2020-12-16 ENCOUNTER — ROUTINE PRENATAL (OUTPATIENT)
Dept: OBGYN CLINIC | Facility: CLINIC | Age: 30
End: 2020-12-16
Payer: MEDICAID

## 2020-12-16 ENCOUNTER — OFFICE VISIT (OUTPATIENT)
Dept: HEMATOLOGY/ONCOLOGY | Facility: HOSPITAL | Age: 30
End: 2020-12-16
Attending: OBSTETRICS & GYNECOLOGY
Payer: MEDICAID

## 2020-12-16 VITALS
HEART RATE: 62 BPM | BODY MASS INDEX: 24.63 KG/M2 | SYSTOLIC BLOOD PRESSURE: 100 MMHG | WEIGHT: 139 LBS | DIASTOLIC BLOOD PRESSURE: 60 MMHG | HEIGHT: 63 IN

## 2020-12-16 VITALS
SYSTOLIC BLOOD PRESSURE: 98 MMHG | HEART RATE: 65 BPM | DIASTOLIC BLOOD PRESSURE: 60 MMHG | TEMPERATURE: 98 F | OXYGEN SATURATION: 98 % | RESPIRATION RATE: 16 BRPM

## 2020-12-16 DIAGNOSIS — D50.9 IRON DEFICIENCY ANEMIA OF PREGNANCY: Primary | ICD-10-CM

## 2020-12-16 DIAGNOSIS — Z3A.34 34 WEEKS GESTATION OF PREGNANCY: ICD-10-CM

## 2020-12-16 DIAGNOSIS — Z34.83 PRENATAL CARE, SUBSEQUENT PREGNANCY IN THIRD TRIMESTER: Primary | ICD-10-CM

## 2020-12-16 DIAGNOSIS — O99.019 IRON DEFICIENCY ANEMIA OF PREGNANCY: Primary | ICD-10-CM

## 2020-12-16 PROCEDURE — 96374 THER/PROPH/DIAG INJ IV PUSH: CPT

## 2020-12-16 PROCEDURE — 0502F SUBSEQUENT PRENATAL CARE: CPT | Performed by: OBSTETRICS & GYNECOLOGY

## 2020-12-16 PROCEDURE — 3074F SYST BP LT 130 MM HG: CPT | Performed by: OBSTETRICS & GYNECOLOGY

## 2020-12-16 PROCEDURE — 3078F DIAST BP <80 MM HG: CPT | Performed by: OBSTETRICS & GYNECOLOGY

## 2020-12-16 PROCEDURE — 81002 URINALYSIS NONAUTO W/O SCOPE: CPT | Performed by: OBSTETRICS & GYNECOLOGY

## 2020-12-16 PROCEDURE — 3008F BODY MASS INDEX DOCD: CPT | Performed by: OBSTETRICS & GYNECOLOGY

## 2020-12-16 RX ORDER — FAMOTIDINE 10 MG/ML
20 INJECTION, SOLUTION INTRAVENOUS ONCE
Status: CANCELLED | OUTPATIENT
Start: 2020-12-18

## 2020-12-16 RX ORDER — DIPHENHYDRAMINE HYDROCHLORIDE 50 MG/ML
25 INJECTION INTRAMUSCULAR; INTRAVENOUS ONCE
Status: CANCELLED | OUTPATIENT
Start: 2020-12-18

## 2020-12-16 RX ORDER — MEPERIDINE HYDROCHLORIDE 25 MG/ML
25 INJECTION INTRAMUSCULAR; INTRAVENOUS; SUBCUTANEOUS ONCE
Status: CANCELLED | OUTPATIENT
Start: 2020-12-18

## 2020-12-16 RX ORDER — METHYLPREDNISOLONE SODIUM SUCCINATE 40 MG/ML
40 INJECTION, POWDER, LYOPHILIZED, FOR SOLUTION INTRAMUSCULAR; INTRAVENOUS ONCE
Status: CANCELLED | OUTPATIENT
Start: 2020-12-18

## 2020-12-16 RX ORDER — METHYLPREDNISOLONE SODIUM SUCCINATE 125 MG/2ML
125 INJECTION, POWDER, LYOPHILIZED, FOR SOLUTION INTRAMUSCULAR; INTRAVENOUS ONCE
Status: CANCELLED | OUTPATIENT
Start: 2020-12-18

## 2020-12-16 NOTE — PROGRESS NOTES
Education Record    Learner:  Patient    Disease / Diagnosis: EDWARD during pregnancy - IV Venofer infusion    Barriers / Limitations:  None   Comments:    Method:  Brief focused and Reinforcement   Comments:    General Topics:  Plan of care reviewed   Commcady

## 2020-12-16 NOTE — PROGRESS NOTES
No complaintsNo complaints  - GBS next visit  - cfDNA neg male  - declines TDAP and flu shot  1.  Anemia  - HB dropped 10.7 to 10.4, ferritin 2.8 -  IV iron first infusion scheduled today

## 2020-12-17 ENCOUNTER — TELEPHONE (OUTPATIENT)
Dept: OBGYN CLINIC | Facility: CLINIC | Age: 30
End: 2020-12-17

## 2020-12-17 ENCOUNTER — HOSPITAL ENCOUNTER (OUTPATIENT)
Facility: HOSPITAL | Age: 30
Setting detail: OBSERVATION
Discharge: HOME | End: 2020-12-17
Attending: OBSTETRICS & GYNECOLOGY | Admitting: OBSTETRICS & GYNECOLOGY
Payer: MEDICAID

## 2020-12-17 VITALS
DIASTOLIC BLOOD PRESSURE: 68 MMHG | HEART RATE: 78 BPM | SYSTOLIC BLOOD PRESSURE: 117 MMHG | HEIGHT: 62.99 IN | WEIGHT: 139 LBS | BODY MASS INDEX: 24.63 KG/M2

## 2020-12-17 PROBLEM — Z34.90 PREGNANCY (HCC): Status: ACTIVE | Noted: 2020-12-17

## 2020-12-17 PROBLEM — O36.8190 DECREASED FETAL MOVEMENT AFFECTING MANAGEMENT OF MOTHER, ANTEPARTUM (HCC): Status: ACTIVE | Noted: 2020-12-17

## 2020-12-17 PROBLEM — O36.8190 DECREASED FETAL MOVEMENT AFFECTING MANAGEMENT OF MOTHER, ANTEPARTUM: Status: ACTIVE | Noted: 2020-12-17

## 2020-12-17 PROBLEM — Z34.90 PREGNANCY: Status: ACTIVE | Noted: 2020-12-17

## 2020-12-17 PROCEDURE — 99213 OFFICE O/P EST LOW 20 MIN: CPT

## 2020-12-17 PROCEDURE — 59025 FETAL NON-STRESS TEST: CPT

## 2020-12-17 NOTE — TELEPHONE ENCOUNTER
Pt called stating she had decreased fetal movement. Has eaten and had something to drink. Pt advised to go to l&d for evaluation. Understanding verbalized. Will take her an hour to get there.

## 2020-12-17 NOTE — NST
Nonstress Test   Patient: Louisa Thompson    Gestation: 34w5d    NST:       Variability: Moderate           Accelerations: Yes           Decelerations: None            Baseline: 135 BPM           Uterine Irritability: No           Contractions: Not present

## 2020-12-17 NOTE — PROGRESS NOTES
Pt is a 35year old female admitted to TRG2/TRG2-A. Patient presents with:  Decreased Fetal Movement: baby not moving as much     Pt is  34w5d intra-uterine pregnancy. History obtained.  Oriented to room, staff, and plan of care discussed

## 2020-12-18 ENCOUNTER — OFFICE VISIT (OUTPATIENT)
Dept: HEMATOLOGY/ONCOLOGY | Facility: HOSPITAL | Age: 30
End: 2020-12-18
Attending: OBSTETRICS & GYNECOLOGY
Payer: MEDICAID

## 2020-12-18 VITALS
OXYGEN SATURATION: 99 % | WEIGHT: 139.63 LBS | TEMPERATURE: 98 F | SYSTOLIC BLOOD PRESSURE: 100 MMHG | HEART RATE: 74 BPM | RESPIRATION RATE: 16 BRPM | BODY MASS INDEX: 25 KG/M2 | DIASTOLIC BLOOD PRESSURE: 64 MMHG

## 2020-12-18 DIAGNOSIS — O99.019 IRON DEFICIENCY ANEMIA OF PREGNANCY: Primary | ICD-10-CM

## 2020-12-18 DIAGNOSIS — D50.9 IRON DEFICIENCY ANEMIA OF PREGNANCY: Primary | ICD-10-CM

## 2020-12-18 PROCEDURE — 96374 THER/PROPH/DIAG INJ IV PUSH: CPT

## 2020-12-18 RX ORDER — METHYLPREDNISOLONE SODIUM SUCCINATE 40 MG/ML
40 INJECTION, POWDER, LYOPHILIZED, FOR SOLUTION INTRAMUSCULAR; INTRAVENOUS ONCE
Status: CANCELLED | OUTPATIENT
Start: 2020-12-20

## 2020-12-18 RX ORDER — FAMOTIDINE 10 MG/ML
20 INJECTION, SOLUTION INTRAVENOUS ONCE
Status: CANCELLED | OUTPATIENT
Start: 2020-12-20

## 2020-12-18 RX ORDER — DIPHENHYDRAMINE HYDROCHLORIDE 50 MG/ML
25 INJECTION INTRAMUSCULAR; INTRAVENOUS ONCE
Status: CANCELLED | OUTPATIENT
Start: 2020-12-20

## 2020-12-18 RX ORDER — METHYLPREDNISOLONE SODIUM SUCCINATE 125 MG/2ML
125 INJECTION, POWDER, LYOPHILIZED, FOR SOLUTION INTRAMUSCULAR; INTRAVENOUS ONCE
Status: CANCELLED | OUTPATIENT
Start: 2020-12-20

## 2020-12-18 RX ORDER — MEPERIDINE HYDROCHLORIDE 25 MG/ML
25 INJECTION INTRAMUSCULAR; INTRAVENOUS; SUBCUTANEOUS ONCE
Status: CANCELLED | OUTPATIENT
Start: 2020-12-20

## 2020-12-18 NOTE — PROGRESS NOTES
Education Record    Learner:  Patient    Disease / Diagnosis: EDWARD    Barriers / Limitations:  None   Comments:    Method:  Discussion   Comments:    General Topics:  Plan of care reviewed   Comments:    Outcome:  Shows understanding   Comments: pt tolerate

## 2020-12-21 ENCOUNTER — OFFICE VISIT (OUTPATIENT)
Dept: HEMATOLOGY/ONCOLOGY | Facility: HOSPITAL | Age: 30
End: 2020-12-21
Attending: OBSTETRICS & GYNECOLOGY
Payer: MEDICAID

## 2020-12-21 VITALS
SYSTOLIC BLOOD PRESSURE: 106 MMHG | TEMPERATURE: 98 F | DIASTOLIC BLOOD PRESSURE: 71 MMHG | HEART RATE: 81 BPM | OXYGEN SATURATION: 98 % | RESPIRATION RATE: 17 BRPM

## 2020-12-21 DIAGNOSIS — O99.019 IRON DEFICIENCY ANEMIA OF PREGNANCY: Primary | ICD-10-CM

## 2020-12-21 DIAGNOSIS — D50.9 IRON DEFICIENCY ANEMIA OF PREGNANCY: Primary | ICD-10-CM

## 2020-12-21 PROCEDURE — 96374 THER/PROPH/DIAG INJ IV PUSH: CPT

## 2020-12-21 RX ORDER — FAMOTIDINE 10 MG/ML
20 INJECTION, SOLUTION INTRAVENOUS ONCE
Status: CANCELLED | OUTPATIENT
Start: 2020-12-22

## 2020-12-21 RX ORDER — DIPHENHYDRAMINE HYDROCHLORIDE 50 MG/ML
25 INJECTION INTRAMUSCULAR; INTRAVENOUS ONCE
Status: CANCELLED | OUTPATIENT
Start: 2020-12-22

## 2020-12-21 RX ORDER — MEPERIDINE HYDROCHLORIDE 25 MG/ML
25 INJECTION INTRAMUSCULAR; INTRAVENOUS; SUBCUTANEOUS ONCE
Status: CANCELLED | OUTPATIENT
Start: 2020-12-22

## 2020-12-21 RX ORDER — METHYLPREDNISOLONE SODIUM SUCCINATE 125 MG/2ML
125 INJECTION, POWDER, LYOPHILIZED, FOR SOLUTION INTRAMUSCULAR; INTRAVENOUS ONCE
Status: CANCELLED | OUTPATIENT
Start: 2020-12-22

## 2020-12-21 RX ORDER — METHYLPREDNISOLONE SODIUM SUCCINATE 40 MG/ML
40 INJECTION, POWDER, LYOPHILIZED, FOR SOLUTION INTRAMUSCULAR; INTRAVENOUS ONCE
Status: CANCELLED | OUTPATIENT
Start: 2020-12-22

## 2020-12-23 ENCOUNTER — ROUTINE PRENATAL (OUTPATIENT)
Dept: OBGYN CLINIC | Facility: CLINIC | Age: 30
End: 2020-12-23
Payer: MEDICAID

## 2020-12-23 ENCOUNTER — OFFICE VISIT (OUTPATIENT)
Dept: HEMATOLOGY/ONCOLOGY | Facility: HOSPITAL | Age: 30
End: 2020-12-23
Attending: OBSTETRICS & GYNECOLOGY
Payer: MEDICAID

## 2020-12-23 VITALS
BODY MASS INDEX: 24.63 KG/M2 | SYSTOLIC BLOOD PRESSURE: 112 MMHG | DIASTOLIC BLOOD PRESSURE: 52 MMHG | WEIGHT: 139 LBS | HEIGHT: 63 IN

## 2020-12-23 VITALS
TEMPERATURE: 98 F | DIASTOLIC BLOOD PRESSURE: 64 MMHG | RESPIRATION RATE: 16 BRPM | HEART RATE: 73 BPM | SYSTOLIC BLOOD PRESSURE: 102 MMHG | OXYGEN SATURATION: 98 %

## 2020-12-23 DIAGNOSIS — D50.9 IRON DEFICIENCY ANEMIA OF PREGNANCY: Primary | ICD-10-CM

## 2020-12-23 DIAGNOSIS — O99.019 IRON DEFICIENCY ANEMIA OF PREGNANCY: Primary | ICD-10-CM

## 2020-12-23 DIAGNOSIS — Z34.83 PRENATAL CARE, SUBSEQUENT PREGNANCY, THIRD TRIMESTER: Primary | ICD-10-CM

## 2020-12-23 PROCEDURE — 87653 STREP B DNA AMP PROBE: CPT | Performed by: OBSTETRICS & GYNECOLOGY

## 2020-12-23 PROCEDURE — 87081 CULTURE SCREEN ONLY: CPT | Performed by: OBSTETRICS & GYNECOLOGY

## 2020-12-23 PROCEDURE — 0502F SUBSEQUENT PRENATAL CARE: CPT | Performed by: OBSTETRICS & GYNECOLOGY

## 2020-12-23 PROCEDURE — 96374 THER/PROPH/DIAG INJ IV PUSH: CPT

## 2020-12-23 PROCEDURE — 3074F SYST BP LT 130 MM HG: CPT | Performed by: OBSTETRICS & GYNECOLOGY

## 2020-12-23 PROCEDURE — 3008F BODY MASS INDEX DOCD: CPT | Performed by: OBSTETRICS & GYNECOLOGY

## 2020-12-23 PROCEDURE — 81002 URINALYSIS NONAUTO W/O SCOPE: CPT | Performed by: OBSTETRICS & GYNECOLOGY

## 2020-12-23 PROCEDURE — 1111F DSCHRG MED/CURRENT MED MERGE: CPT | Performed by: OBSTETRICS & GYNECOLOGY

## 2020-12-23 PROCEDURE — 3078F DIAST BP <80 MM HG: CPT | Performed by: OBSTETRICS & GYNECOLOGY

## 2020-12-23 RX ORDER — METHYLPREDNISOLONE SODIUM SUCCINATE 125 MG/2ML
125 INJECTION, POWDER, LYOPHILIZED, FOR SOLUTION INTRAMUSCULAR; INTRAVENOUS ONCE
OUTPATIENT
Start: 2020-12-25

## 2020-12-23 RX ORDER — FAMOTIDINE 10 MG/ML
20 INJECTION, SOLUTION INTRAVENOUS ONCE
OUTPATIENT
Start: 2020-12-25

## 2020-12-23 RX ORDER — MEPERIDINE HYDROCHLORIDE 25 MG/ML
25 INJECTION INTRAMUSCULAR; INTRAVENOUS; SUBCUTANEOUS ONCE
OUTPATIENT
Start: 2020-12-25

## 2020-12-23 RX ORDER — METHYLPREDNISOLONE SODIUM SUCCINATE 40 MG/ML
40 INJECTION, POWDER, LYOPHILIZED, FOR SOLUTION INTRAMUSCULAR; INTRAVENOUS ONCE
OUTPATIENT
Start: 2020-12-25

## 2020-12-23 RX ORDER — DIPHENHYDRAMINE HYDROCHLORIDE 50 MG/ML
25 INJECTION INTRAMUSCULAR; INTRAVENOUS ONCE
OUTPATIENT
Start: 2020-12-25

## 2020-12-23 NOTE — PROGRESS NOTES
Education Record    Learner:  Patient    Disease / Diagnosis: anemia    Barriers / Limitations:  None   Comments:    Method:  Discussion   Comments:    General Topics:  Medication   Comments:    Outcome:  Shows understanding   Comments:    Venofer given.

## 2020-12-23 NOTE — PROGRESS NOTES
She has a pediatrician in a car seat. Preadmission form was done. Labor and delivery instructions given.  Beta strep

## 2020-12-28 ENCOUNTER — OFFICE VISIT (OUTPATIENT)
Dept: HEMATOLOGY/ONCOLOGY | Facility: HOSPITAL | Age: 30
End: 2020-12-28
Attending: OBSTETRICS & GYNECOLOGY
Payer: MEDICAID

## 2020-12-28 VITALS
TEMPERATURE: 98 F | SYSTOLIC BLOOD PRESSURE: 94 MMHG | RESPIRATION RATE: 18 BRPM | DIASTOLIC BLOOD PRESSURE: 56 MMHG | HEART RATE: 82 BPM | OXYGEN SATURATION: 99 %

## 2020-12-28 DIAGNOSIS — O99.019 IRON DEFICIENCY ANEMIA OF PREGNANCY: Primary | ICD-10-CM

## 2020-12-28 DIAGNOSIS — D50.9 IRON DEFICIENCY ANEMIA OF PREGNANCY: Primary | ICD-10-CM

## 2020-12-28 PROCEDURE — 96374 THER/PROPH/DIAG INJ IV PUSH: CPT

## 2020-12-28 RX ORDER — MEPERIDINE HYDROCHLORIDE 25 MG/ML
25 INJECTION INTRAMUSCULAR; INTRAVENOUS; SUBCUTANEOUS ONCE
OUTPATIENT
Start: 2020-12-29

## 2020-12-28 RX ORDER — METHYLPREDNISOLONE SODIUM SUCCINATE 125 MG/2ML
125 INJECTION, POWDER, LYOPHILIZED, FOR SOLUTION INTRAMUSCULAR; INTRAVENOUS ONCE
OUTPATIENT
Start: 2020-12-29

## 2020-12-28 RX ORDER — METHYLPREDNISOLONE SODIUM SUCCINATE 40 MG/ML
40 INJECTION, POWDER, LYOPHILIZED, FOR SOLUTION INTRAMUSCULAR; INTRAVENOUS ONCE
OUTPATIENT
Start: 2020-12-29

## 2020-12-28 RX ORDER — DIPHENHYDRAMINE HYDROCHLORIDE 50 MG/ML
25 INJECTION INTRAMUSCULAR; INTRAVENOUS ONCE
OUTPATIENT
Start: 2020-12-29

## 2020-12-28 RX ORDER — FAMOTIDINE 10 MG/ML
20 INJECTION, SOLUTION INTRAVENOUS ONCE
OUTPATIENT
Start: 2020-12-29

## 2020-12-30 ENCOUNTER — ROUTINE PRENATAL (OUTPATIENT)
Dept: OBGYN CLINIC | Facility: CLINIC | Age: 30
End: 2020-12-30
Payer: MEDICAID

## 2020-12-30 VITALS
BODY MASS INDEX: 25.16 KG/M2 | DIASTOLIC BLOOD PRESSURE: 56 MMHG | SYSTOLIC BLOOD PRESSURE: 108 MMHG | WEIGHT: 142 LBS | HEIGHT: 63 IN

## 2020-12-30 DIAGNOSIS — Z34.83 PRENATAL CARE, SUBSEQUENT PREGNANCY, THIRD TRIMESTER: Primary | ICD-10-CM

## 2020-12-30 DIAGNOSIS — Z3A.36 36 WEEKS GESTATION OF PREGNANCY: ICD-10-CM

## 2020-12-30 PROCEDURE — 3078F DIAST BP <80 MM HG: CPT | Performed by: OBSTETRICS & GYNECOLOGY

## 2020-12-30 PROCEDURE — 3008F BODY MASS INDEX DOCD: CPT | Performed by: OBSTETRICS & GYNECOLOGY

## 2020-12-30 PROCEDURE — 0502F SUBSEQUENT PRENATAL CARE: CPT | Performed by: OBSTETRICS & GYNECOLOGY

## 2020-12-30 PROCEDURE — 81002 URINALYSIS NONAUTO W/O SCOPE: CPT | Performed by: OBSTETRICS & GYNECOLOGY

## 2020-12-30 PROCEDURE — 3074F SYST BP LT 130 MM HG: CPT | Performed by: OBSTETRICS & GYNECOLOGY

## 2021-01-05 ENCOUNTER — ROUTINE PRENATAL (OUTPATIENT)
Dept: OBGYN CLINIC | Facility: CLINIC | Age: 31
End: 2021-01-05
Payer: MEDICAID

## 2021-01-05 VITALS
HEART RATE: 73 BPM | BODY MASS INDEX: 25.69 KG/M2 | WEIGHT: 145 LBS | HEIGHT: 63 IN | SYSTOLIC BLOOD PRESSURE: 110 MMHG | DIASTOLIC BLOOD PRESSURE: 70 MMHG

## 2021-01-05 DIAGNOSIS — Z3A.37 37 WEEKS GESTATION OF PREGNANCY: ICD-10-CM

## 2021-01-05 DIAGNOSIS — Z36.9 PRENATAL SCREENING ENCOUNTER: ICD-10-CM

## 2021-01-05 DIAGNOSIS — Z34.83 PRENATAL CARE, SUBSEQUENT PREGNANCY IN THIRD TRIMESTER: Primary | ICD-10-CM

## 2021-01-05 LAB
GLUCOSE (URINE DIPSTICK): NEGATIVE MG/DL
MULTISTIX LOT#: 6038 NUMERIC
PROTEIN (URINE DIPSTICK): NEGATIVE MG/DL

## 2021-01-05 PROCEDURE — 3008F BODY MASS INDEX DOCD: CPT | Performed by: OBSTETRICS & GYNECOLOGY

## 2021-01-05 PROCEDURE — 3074F SYST BP LT 130 MM HG: CPT | Performed by: OBSTETRICS & GYNECOLOGY

## 2021-01-05 PROCEDURE — 0502F SUBSEQUENT PRENATAL CARE: CPT | Performed by: OBSTETRICS & GYNECOLOGY

## 2021-01-05 PROCEDURE — 81002 URINALYSIS NONAUTO W/O SCOPE: CPT | Performed by: OBSTETRICS & GYNECOLOGY

## 2021-01-05 PROCEDURE — 3078F DIAST BP <80 MM HG: CPT | Performed by: OBSTETRICS & GYNECOLOGY

## 2021-01-05 NOTE — PROGRESS NOTES
No complaintsNo complaints  - cfDNA neg male  - declines TDAP and flu shot  1.  Anemia  - HB dropped 10.7 to 10.4, ferritin 2.8 -  IV iron first infusion scheduled today

## 2021-01-12 ENCOUNTER — ROUTINE PRENATAL (OUTPATIENT)
Dept: OBGYN CLINIC | Facility: CLINIC | Age: 31
End: 2021-01-12
Payer: MEDICAID

## 2021-01-12 VITALS
WEIGHT: 145 LBS | SYSTOLIC BLOOD PRESSURE: 110 MMHG | BODY MASS INDEX: 25.69 KG/M2 | DIASTOLIC BLOOD PRESSURE: 50 MMHG | HEIGHT: 63 IN

## 2021-01-12 DIAGNOSIS — Z3A.38 38 WEEKS GESTATION OF PREGNANCY: ICD-10-CM

## 2021-01-12 DIAGNOSIS — Z34.83 PRENATAL CARE, SUBSEQUENT PREGNANCY, THIRD TRIMESTER: Primary | ICD-10-CM

## 2021-01-12 LAB — MULTISTIX LOT#: 5073 NUMERIC

## 2021-01-12 PROCEDURE — 3078F DIAST BP <80 MM HG: CPT | Performed by: OBSTETRICS & GYNECOLOGY

## 2021-01-12 PROCEDURE — 3008F BODY MASS INDEX DOCD: CPT | Performed by: OBSTETRICS & GYNECOLOGY

## 2021-01-12 PROCEDURE — 81002 URINALYSIS NONAUTO W/O SCOPE: CPT | Performed by: OBSTETRICS & GYNECOLOGY

## 2021-01-12 PROCEDURE — 0502F SUBSEQUENT PRENATAL CARE: CPT | Performed by: OBSTETRICS & GYNECOLOGY

## 2021-01-12 PROCEDURE — 3074F SYST BP LT 130 MM HG: CPT | Performed by: OBSTETRICS & GYNECOLOGY

## 2021-01-12 NOTE — PROGRESS NOTES
No complaintsNo complaints  - cfDNA neg male  - declines TDAP and flu shot  - labor instructions  1.  Anemia  - HB dropped 10.7 to 10.4, ferritin 2.8 -  IV iron completed

## 2021-01-20 ENCOUNTER — TELEPHONE (OUTPATIENT)
Dept: OBGYN UNIT | Facility: HOSPITAL | Age: 31
End: 2021-01-20

## 2021-01-20 ENCOUNTER — HOSPITAL ENCOUNTER (INPATIENT)
Facility: HOSPITAL | Age: 31
LOS: 2 days | Discharge: HOME | End: 2021-01-22
Attending: OBSTETRICS & GYNECOLOGY | Admitting: OBSTETRICS & GYNECOLOGY
Payer: MEDICAID

## 2021-01-20 ENCOUNTER — ROUTINE PRENATAL (OUTPATIENT)
Dept: OBGYN CLINIC | Facility: CLINIC | Age: 31
End: 2021-01-20
Payer: MEDICAID

## 2021-01-20 VITALS
DIASTOLIC BLOOD PRESSURE: 54 MMHG | BODY MASS INDEX: 25.87 KG/M2 | HEART RATE: 73 BPM | WEIGHT: 146 LBS | HEIGHT: 63 IN | SYSTOLIC BLOOD PRESSURE: 102 MMHG

## 2021-01-20 DIAGNOSIS — Z34.83 PRENATAL CARE, SUBSEQUENT PREGNANCY, THIRD TRIMESTER: Primary | ICD-10-CM

## 2021-01-20 DIAGNOSIS — Z3A.39 39 WEEKS GESTATION OF PREGNANCY: Primary | ICD-10-CM

## 2021-01-20 LAB
ANTIBODY SCREEN: NEGATIVE
BASOPHILS # BLD AUTO: 0.03 X10(3) UL (ref 0–0.2)
BASOPHILS NFR BLD AUTO: 0.3 %
DEPRECATED RDW RBC AUTO: 53.5 FL (ref 35.1–46.3)
EOSINOPHIL # BLD AUTO: 0.07 X10(3) UL (ref 0–0.7)
EOSINOPHIL NFR BLD AUTO: 0.7 %
ERYTHROCYTE [DISTWIDTH] IN BLOOD BY AUTOMATED COUNT: 17.4 % (ref 11–15)
HCT VFR BLD AUTO: 38 %
HGB BLD-MCNC: 12.6 G/DL
IMM GRANULOCYTES # BLD AUTO: 0.1 X10(3) UL (ref 0–1)
IMM GRANULOCYTES NFR BLD: 1.1 %
LYMPHOCYTES # BLD AUTO: 2.33 X10(3) UL (ref 1–4)
LYMPHOCYTES NFR BLD AUTO: 24.5 %
MCH RBC QN AUTO: 28 PG (ref 26–34)
MCHC RBC AUTO-ENTMCNC: 33.2 G/DL (ref 31–37)
MCV RBC AUTO: 84.4 FL
MONOCYTES # BLD AUTO: 0.71 X10(3) UL (ref 0.1–1)
MONOCYTES NFR BLD AUTO: 7.5 %
MULTISTIX EXPIRATION DATE: NORMAL DATE
MULTISTIX LOT#: 6038 NUMERIC
NEUTROPHILS # BLD AUTO: 6.27 X10 (3) UL (ref 1.5–7.7)
NEUTROPHILS # BLD AUTO: 6.27 X10(3) UL (ref 1.5–7.7)
NEUTROPHILS NFR BLD AUTO: 65.9 %
PLATELET # BLD AUTO: 211 10(3)UL (ref 150–450)
RBC # BLD AUTO: 4.5 X10(6)UL
RH BLOOD TYPE: POSITIVE
SARS-COV-2 RNA RESP QL NAA+PROBE: NOT DETECTED
T PALLIDUM AB SER QL IA: NONREACTIVE
WBC # BLD AUTO: 9.5 X10(3) UL (ref 4–11)

## 2021-01-20 PROCEDURE — 81002 URINALYSIS NONAUTO W/O SCOPE: CPT | Performed by: OBSTETRICS & GYNECOLOGY

## 2021-01-20 PROCEDURE — 3074F SYST BP LT 130 MM HG: CPT | Performed by: OBSTETRICS & GYNECOLOGY

## 2021-01-20 PROCEDURE — 3008F BODY MASS INDEX DOCD: CPT | Performed by: OBSTETRICS & GYNECOLOGY

## 2021-01-20 PROCEDURE — 3078F DIAST BP <80 MM HG: CPT | Performed by: OBSTETRICS & GYNECOLOGY

## 2021-01-20 PROCEDURE — 0502F SUBSEQUENT PRENATAL CARE: CPT | Performed by: OBSTETRICS & GYNECOLOGY

## 2021-01-20 RX ORDER — DEXTROSE, SODIUM CHLORIDE, SODIUM LACTATE, POTASSIUM CHLORIDE, AND CALCIUM CHLORIDE 5; .6; .31; .03; .02 G/100ML; G/100ML; G/100ML; G/100ML; G/100ML
INJECTION, SOLUTION INTRAVENOUS AS NEEDED
Status: DISCONTINUED | OUTPATIENT
Start: 2021-01-20 | End: 2021-01-21 | Stop reason: HOSPADM

## 2021-01-20 RX ORDER — ONDANSETRON 2 MG/ML
4 INJECTION INTRAMUSCULAR; INTRAVENOUS EVERY 6 HOURS PRN
Status: DISCONTINUED | OUTPATIENT
Start: 2021-01-20 | End: 2021-01-21 | Stop reason: HOSPADM

## 2021-01-20 RX ORDER — SODIUM CHLORIDE, SODIUM LACTATE, POTASSIUM CHLORIDE, CALCIUM CHLORIDE 600; 310; 30; 20 MG/100ML; MG/100ML; MG/100ML; MG/100ML
INJECTION, SOLUTION INTRAVENOUS CONTINUOUS
Status: DISCONTINUED | OUTPATIENT
Start: 2021-01-20 | End: 2021-01-21 | Stop reason: HOSPADM

## 2021-01-20 RX ORDER — NALBUPHINE HCL 10 MG/ML
2.5 AMPUL (ML) INJECTION
Status: DISCONTINUED | OUTPATIENT
Start: 2021-01-20 | End: 2021-01-21

## 2021-01-20 RX ORDER — BUPIVACAINE HCL/0.9 % NACL/PF 0.25 %
5 PLASTIC BAG, INJECTION (ML) EPIDURAL AS NEEDED
Status: DISCONTINUED | OUTPATIENT
Start: 2021-01-20 | End: 2021-01-21

## 2021-01-20 RX ORDER — ACETAMINOPHEN 500 MG
500 TABLET ORAL EVERY 6 HOURS PRN
Status: DISCONTINUED | OUTPATIENT
Start: 2021-01-20 | End: 2021-01-21 | Stop reason: HOSPADM

## 2021-01-20 RX ORDER — IBUPROFEN 600 MG/1
600 TABLET ORAL EVERY 6 HOURS PRN
Status: DISCONTINUED | OUTPATIENT
Start: 2021-01-20 | End: 2021-01-21 | Stop reason: HOSPADM

## 2021-01-20 RX ORDER — TRISODIUM CITRATE DIHYDRATE AND CITRIC ACID MONOHYDRATE 500; 334 MG/5ML; MG/5ML
30 SOLUTION ORAL AS NEEDED
Status: DISCONTINUED | OUTPATIENT
Start: 2021-01-20 | End: 2021-01-21 | Stop reason: HOSPADM

## 2021-01-20 RX ORDER — TERBUTALINE SULFATE 1 MG/ML
0.25 INJECTION, SOLUTION SUBCUTANEOUS AS NEEDED
Status: DISCONTINUED | OUTPATIENT
Start: 2021-01-20 | End: 2021-01-21 | Stop reason: HOSPADM

## 2021-01-20 RX ORDER — AMMONIA INHALANTS 0.04 G/.3ML
0.3 INHALANT RESPIRATORY (INHALATION) AS NEEDED
Status: DISCONTINUED | OUTPATIENT
Start: 2021-01-20 | End: 2021-01-21 | Stop reason: HOSPADM

## 2021-01-20 NOTE — PROGRESS NOTES
C/o feeling pelvic discomfort - IOL 1/22/21 at 7 am    - cfDNA neg male  - declines TDAP and flu shot  - labor instructions  1.  Anemia  - HB dropped 10.7 to 10.4, ferritin 2.8 -  IV iron completed

## 2021-01-21 ENCOUNTER — ANESTHESIA EVENT (OUTPATIENT)
Dept: OBGYN UNIT | Facility: HOSPITAL | Age: 31
End: 2021-01-21
Payer: MEDICAID

## 2021-01-21 ENCOUNTER — ANESTHESIA (OUTPATIENT)
Dept: OBGYN UNIT | Facility: HOSPITAL | Age: 31
End: 2021-01-21
Payer: MEDICAID

## 2021-01-21 PROCEDURE — 59409 OBSTETRICAL CARE: CPT | Performed by: OBSTETRICS & GYNECOLOGY

## 2021-01-21 PROCEDURE — 0KQM0ZZ REPAIR PERINEUM MUSCLE, OPEN APPROACH: ICD-10-PCS | Performed by: OBSTETRICS & GYNECOLOGY

## 2021-01-21 RX ORDER — DOCUSATE SODIUM 100 MG/1
100 CAPSULE, LIQUID FILLED ORAL
Status: DISCONTINUED | OUTPATIENT
Start: 2021-01-21 | End: 2021-01-22

## 2021-01-21 RX ORDER — SIMETHICONE 80 MG
80 TABLET,CHEWABLE ORAL 3 TIMES DAILY PRN
Status: DISCONTINUED | OUTPATIENT
Start: 2021-01-21 | End: 2021-01-22

## 2021-01-21 RX ORDER — ACETAMINOPHEN 325 MG/1
650 TABLET ORAL EVERY 6 HOURS PRN
Status: DISCONTINUED | OUTPATIENT
Start: 2021-01-21 | End: 2021-01-22

## 2021-01-21 RX ORDER — IBUPROFEN 600 MG/1
600 TABLET ORAL EVERY 6 HOURS
Status: DISCONTINUED | OUTPATIENT
Start: 2021-01-21 | End: 2021-01-22

## 2021-01-21 RX ORDER — BISACODYL 10 MG
10 SUPPOSITORY, RECTAL RECTAL ONCE AS NEEDED
Status: DISCONTINUED | OUTPATIENT
Start: 2021-01-21 | End: 2021-01-22

## 2021-01-21 NOTE — PROGRESS NOTES
Patient up to bathroom with assist.  Voided 100. Patient transferred to mother/baby room 2196 per wheelchair in stable condition with baby and personal belongings. Accompanied by significant other and staff. Report given to mother/baby RN.

## 2021-01-21 NOTE — L&D DELIVERY NOTE
Jatin Monroe [[id number]]    Labor Events     labor?: No   steroids?: None  Antibiotics received during labor?: No  Antibiotics (enter # doses in comment): none  Rupture date/time: 2021     Rupture type: SROM  Fluid color: Clear Absent Weak cry; hypoventilation Good, crying              1 Minute:  5 Minute:  10 Minute:  15 Minute:  20 Minute:    Skin color: 0  1       Heart rate: 2  2       Reflex irritablity: 2  2       Muscle tone: 2  2       Respiratory effort: 2  2       Total

## 2021-01-21 NOTE — PLAN OF CARE
Problem: Patient/Family Goals  Goal: Patient/Family Long Term Goal  Description: Patient's Long Term Goal: Adequate pain management    Interventions:  -   - See additional Care Plan goals for specific interventions  Outcome: Progressing  Goal: Patient/Fa

## 2021-01-21 NOTE — CM/SW NOTE
met with patient, Jordan Harkins, and her spouse, Sylvia Culp.  asked if infant would be added to medicaid ? Del Marhomar Gomesmiguel ángel stated yes, and that she already spoke [facility] about adding infant on to medicaid.   asked if couple had selec

## 2021-01-21 NOTE — ANESTHESIA PROCEDURE NOTES
Labor Analgesia  Performed by: Mohinder Oshea MD  Authorized by: Mohinder Oshea MD       General Information and Staff    Start Time:  1/21/2021 12:22 AM  End Time:  1/21/2021 12:38 AM  Anesthesiologist:  Mohinder Oshea MD  Performed by:   Anesthesiol
Depression

## 2021-01-21 NOTE — PROGRESS NOTES
Admission Note:    Patient admitted to  second floor mother/baby. ID bands verified. Hugs/kisses in place. Patient oriented to room and informed of plan of care. Postpartum teaching initiated. Call light in reach.

## 2021-01-21 NOTE — H&P
[facility] Patient Status:  Inpatient    1990 MRN [id number]   Location [facility] Attending Sharifa Stern, [facility] Day # 1 PCP None Pcp     Date of Admission:  2021    LOPEZ non-reactive, GBS negative       ASSESSMENT:    39 and 5/7 weeks gestation. SROM  Obstetrical history significant for anemia. PLAN:    Risks, benefits, alternatives and possible complications have been discussed in detail with the patient.   Pre-admissi

## 2021-01-21 NOTE — ANESTHESIA PREPROCEDURE EVALUATION
PRE-OP EVALUATION    Patient Name: Connie Bates    Pre-op Diagnosis: * No pre-op diagnosis entered *    * No procedures listed *    * No surgeons found in log *    Pre-op vitals reviewed.   Temp: 98 °F (36.7 °C)  Pulse: 72  Resp: 16  BP: 112/63     Body history of anesthetic complications         GI/Hepatic/Renal                                 Cardiovascular        Exercise tolerance: good     MET: >4                                           Endo/Other                                  Pulmonary

## 2021-01-21 NOTE — PROGRESS NOTES
admitted to triage 5. Ambulated to room, to bathroom, gown on, pt to bed. Pt states at [address] had leaking of clear pink tinged fluid and had to wear a pad x2. Pt continues to leak clear fluid on peripad. efm and toco applied.

## 2021-01-21 NOTE — PLAN OF CARE
Problem: SAFETY ADULT - FALL  Goal: Free from fall injury  Description: INTERVENTIONS:  - Assess pt frequently for physical needs  - Identify cognitive and physical deficits and behaviors that affect risk of falls.   - [place] fall precautions as indica previous experience with breast feeding.  - Provide information as needed about early infant feeding cues (e.g., rooting, lip smacking, sucking fingers/hand) versus late cue of crying.  - Discuss/demonstrate breast feeding aids (e.g., infant sling, nursing

## 2021-01-22 VITALS
RESPIRATION RATE: 16 BRPM | DIASTOLIC BLOOD PRESSURE: 75 MMHG | BODY MASS INDEX: 25.87 KG/M2 | WEIGHT: 146 LBS | SYSTOLIC BLOOD PRESSURE: 119 MMHG | OXYGEN SATURATION: 97 % | TEMPERATURE: 98 F | HEIGHT: 63 IN | HEART RATE: 69 BPM

## 2021-01-22 LAB
BASOPHILS # BLD AUTO: 0.04 X10(3) UL (ref 0–0.2)
BASOPHILS NFR BLD AUTO: 0.4 %
DEPRECATED RDW RBC AUTO: 58.1 FL (ref 35.1–46.3)
EOSINOPHIL # BLD AUTO: 0.1 X10(3) UL (ref 0–0.7)
EOSINOPHIL NFR BLD AUTO: 0.9 %
ERYTHROCYTE [DISTWIDTH] IN BLOOD BY AUTOMATED COUNT: 17.9 % (ref 11–15)
HCT VFR BLD AUTO: 39 %
HGB BLD-MCNC: 12.3 G/DL
IMM GRANULOCYTES # BLD AUTO: 0.11 X10(3) UL (ref 0–1)
IMM GRANULOCYTES NFR BLD: 1 %
LYMPHOCYTES # BLD AUTO: 2.2 X10(3) UL (ref 1–4)
LYMPHOCYTES NFR BLD AUTO: 20.1 %
MCH RBC QN AUTO: 27.8 PG (ref 26–34)
MCHC RBC AUTO-ENTMCNC: 31.5 G/DL (ref 31–37)
MCV RBC AUTO: 88 FL
MONOCYTES # BLD AUTO: 0.81 X10(3) UL (ref 0.1–1)
MONOCYTES NFR BLD AUTO: 7.4 %
NEUTROPHILS # BLD AUTO: 7.66 X10 (3) UL (ref 1.5–7.7)
NEUTROPHILS # BLD AUTO: 7.66 X10(3) UL (ref 1.5–7.7)
NEUTROPHILS NFR BLD AUTO: 70.2 %
PLATELET # BLD AUTO: 215 10(3)UL (ref 150–450)
RBC # BLD AUTO: 4.43 X10(6)UL
WBC # BLD AUTO: 10.9 X10(3) UL (ref 4–11)

## 2021-01-22 NOTE — PROGRESS NOTES
PPD#1    Pt has no complaints, lochia min   01/22/21  0800   BP:    Pulse:    Resp: 16   Temp: 97.8 °F (36.6 °C)     Recent Labs   Lab 01/20/21  2103 01/22/21  0801   RBC 4.50 4.43   HGB 12.6 12.3   HCT 38.0 39.0   MCV 84.4 88.0   MCH 28.0 27.8   MCHC 33. 2

## 2021-01-22 NOTE — PLAN OF CARE
Problem: SAFETY ADULT - FALL  Goal: Free from fall injury  Description: INTERVENTIONS:  - Assess pt frequently for physical needs  - Identify cognitive and physical deficits and behaviors that affect risk of falls.   - [place] fall precautions as indica previous experience with breast feeding.  - Provide information as needed about early infant feeding cues (e.g., rooting, lip smacking, sucking fingers/hand) versus late cue of crying.  - Discuss/demonstrate breast feeding aids (e.g., infant sling, nursing

## 2021-01-22 NOTE — PROGRESS NOTES
Labor Analgesia Follow Up Note    Patient underwent epidural anesthesia for labor analgesia,    Placenta Date/Time: 1/21/2021  1:52 AM    Delivery Date/Time[de-identified] 1/21/2021  1:51 AM    /65 (BP Location: Right arm)   Pulse 60   Temp 97.8 °F (36.6 °C) (Ora

## 2021-01-22 NOTE — PLAN OF CARE
Problem: SAFETY ADULT - FALL  Goal: Free from fall injury  Description: INTERVENTIONS:  - Assess pt frequently for physical needs  - Identify cognitive and physical deficits and behaviors that affect risk of falls.   - [place] fall precautions as indica previous experience with breast feeding.  - Provide information as needed about early infant feeding cues (e.g., rooting, lip smacking, sucking fingers/hand) versus late cue of crying.  - Discuss/demonstrate breast feeding aids (e.g., infant sling, nursing

## 2021-01-25 ENCOUNTER — TELEPHONE (OUTPATIENT)
Dept: OBGYN UNIT | Facility: HOSPITAL | Age: 31
End: 2021-01-25

## 2021-02-01 ENCOUNTER — TELEPHONE (OUTPATIENT)
Dept: OBGYN CLINIC | Facility: CLINIC | Age: 31
End: 2021-02-01

## 2021-02-01 NOTE — TELEPHONE ENCOUNTER
Per letter from [facility] as of 01/21/2021. inpatient  hospital stay  has been approved. approval # [id number]/[id number]/22/2021.

## 2021-03-04 ENCOUNTER — POSTPARTUM (OUTPATIENT)
Dept: OBGYN CLINIC | Facility: CLINIC | Age: 31
End: 2021-03-04
Payer: MEDICAID

## 2021-03-04 VITALS
HEART RATE: 73 BPM | SYSTOLIC BLOOD PRESSURE: 98 MMHG | DIASTOLIC BLOOD PRESSURE: 60 MMHG | BODY MASS INDEX: 22.32 KG/M2 | HEIGHT: 63 IN | WEIGHT: 126 LBS | RESPIRATION RATE: 14 BRPM

## 2021-03-04 PROBLEM — O36.8190 DECREASED FETAL MOVEMENT AFFECTING MANAGEMENT OF MOTHER, ANTEPARTUM (HCC): Status: RESOLVED | Noted: 2020-12-17 | Resolved: 2021-03-04

## 2021-03-04 PROBLEM — Z34.90 PREGNANCY: Status: RESOLVED | Noted: 2020-12-17 | Resolved: 2021-03-04

## 2021-03-04 PROBLEM — Z34.90 PREGNANCY (HCC): Status: RESOLVED | Noted: 2020-12-17 | Resolved: 2021-03-04

## 2021-03-04 PROBLEM — Z28.21 TETANUS, DIPHTHERIA, AND ACELLULAR PERTUSSIS (TDAP) VACCINATION DECLINED: Status: RESOLVED | Noted: 2020-11-02 | Resolved: 2021-03-04

## 2021-03-04 PROBLEM — Z28.21 INFLUENZA VACCINATION DECLINED: Status: RESOLVED | Noted: 2020-11-02 | Resolved: 2021-03-04

## 2021-03-04 PROBLEM — O36.8190 DECREASED FETAL MOVEMENT AFFECTING MANAGEMENT OF MOTHER, ANTEPARTUM: Status: RESOLVED | Noted: 2020-12-17 | Resolved: 2021-03-04

## 2021-03-04 PROCEDURE — 3078F DIAST BP <80 MM HG: CPT | Performed by: OBSTETRICS & GYNECOLOGY

## 2021-03-04 PROCEDURE — 3074F SYST BP LT 130 MM HG: CPT | Performed by: OBSTETRICS & GYNECOLOGY

## 2021-03-04 PROCEDURE — 3008F BODY MASS INDEX DOCD: CPT | Performed by: OBSTETRICS & GYNECOLOGY

## 2021-03-04 NOTE — PROGRESS NOTES
MARISOL    Clair Regan is a 35year old female  here for 6 week post-partum visit. Patient delivered a  male infant on 21 via . Patient desires nothing for contraception. Patient is breast feeding.    Patient denies symptoms of depression without lesions, no abnormal discharge  Cervix:  Normal without tenderness on motion  Uterus: normal in size, contour, position, mobility, without tenderness  Adnexa: normal without masses or tenderness  Perineum: well healed perineum  Anus: no hemorroids

## 2021-09-07 ENCOUNTER — OFFICE VISIT (OUTPATIENT)
Dept: OBGYN CLINIC | Facility: CLINIC | Age: 31
End: 2021-09-07
Payer: MEDICAID

## 2021-09-07 VITALS
BODY MASS INDEX: 19.49 KG/M2 | HEART RATE: 69 BPM | SYSTOLIC BLOOD PRESSURE: 86 MMHG | DIASTOLIC BLOOD PRESSURE: 60 MMHG | WEIGHT: 110 LBS | HEIGHT: 63 IN

## 2021-09-07 DIAGNOSIS — Z12.4 CERVICAL CANCER SCREENING: ICD-10-CM

## 2021-09-07 DIAGNOSIS — Z01.419 ENCOUNTER FOR WELL WOMAN EXAM WITH ROUTINE GYNECOLOGICAL EXAM: Primary | ICD-10-CM

## 2021-09-07 PROCEDURE — 88175 CYTOPATH C/V AUTO FLUID REDO: CPT | Performed by: OBSTETRICS & GYNECOLOGY

## 2021-09-07 PROCEDURE — 87624 HPV HI-RISK TYP POOLED RSLT: CPT | Performed by: OBSTETRICS & GYNECOLOGY

## 2021-09-07 PROCEDURE — 3008F BODY MASS INDEX DOCD: CPT | Performed by: OBSTETRICS & GYNECOLOGY

## 2021-09-07 PROCEDURE — 3074F SYST BP LT 130 MM HG: CPT | Performed by: OBSTETRICS & GYNECOLOGY

## 2021-09-07 PROCEDURE — 3078F DIAST BP <80 MM HG: CPT | Performed by: OBSTETRICS & GYNECOLOGY

## 2021-09-07 PROCEDURE — 99395 PREV VISIT EST AGE 18-39: CPT | Performed by: OBSTETRICS & GYNECOLOGY

## 2021-09-07 NOTE — PROGRESS NOTES
Roberto Carlos Bingham is a 28year old female [id number] Patient's last menstrual period was 2021 (approximate). Patient presents with:  Wellness Visit  . Patient has no complaints, declines b.c.    OBSTETRICS HISTORY:  OB History    Para Term  A Week:       Minutes of Exercise per Session:   Stress:       Feeling of Stress :   Social Connections:       Frequency of Communication with Friends and Family:       Frequency of Social Gatherings with Friends and Family:       Attends [employer] Services: or bruises  Extremities: no edema, no cyanosis  Psychiatric:  Oriented to time, place, person and situation.  Appropriate mood and affect    Pelvic Exam:  External Genitalia: normal appearance, hair distribution, and no lesions  Urethral Meatus:  normal in

## 2021-09-08 LAB — HPV I/H RISK 1 DNA SPEC QL NAA+PROBE: NEGATIVE

## 2023-06-14 ENCOUNTER — OFFICE VISIT (OUTPATIENT)
Facility: LOCATION | Age: 33
End: 2023-06-14
Payer: MEDICAID

## 2023-06-14 VITALS — TEMPERATURE: 98 F | HEART RATE: 79 BPM

## 2023-06-14 DIAGNOSIS — K64.4 EXTERNAL HEMORRHOIDS WITH COMPLICATION: Primary | ICD-10-CM

## 2023-06-14 PROCEDURE — 99203 OFFICE O/P NEW LOW 30 MIN: CPT | Performed by: SURGERY

## 2024-10-17 ENCOUNTER — ULTRASOUND ENCOUNTER (OUTPATIENT)
Facility: CLINIC | Age: 34
End: 2024-10-17
Payer: MEDICAID

## 2024-10-17 ENCOUNTER — TELEPHONE (OUTPATIENT)
Facility: CLINIC | Age: 34
End: 2024-10-17

## 2024-10-17 ENCOUNTER — INITIAL PRENATAL (OUTPATIENT)
Facility: CLINIC | Age: 34
End: 2024-10-17
Payer: MEDICAID

## 2024-10-17 ENCOUNTER — LAB ENCOUNTER (OUTPATIENT)
Dept: LAB | Age: 34
End: 2024-10-17
Attending: OBSTETRICS & GYNECOLOGY
Payer: MEDICAID

## 2024-10-17 VITALS
WEIGHT: 126 LBS | HEIGHT: 63 IN | DIASTOLIC BLOOD PRESSURE: 58 MMHG | SYSTOLIC BLOOD PRESSURE: 100 MMHG | HEART RATE: 98 BPM | BODY MASS INDEX: 22.32 KG/M2

## 2024-10-17 DIAGNOSIS — Z3A.19 19 WEEKS GESTATION OF PREGNANCY (HCC): ICD-10-CM

## 2024-10-17 DIAGNOSIS — Z34.82 PRENATAL CARE, SUBSEQUENT PREGNANCY IN SECOND TRIMESTER (HCC): ICD-10-CM

## 2024-10-17 DIAGNOSIS — Z34.82 PRENATAL CARE, SUBSEQUENT PREGNANCY IN SECOND TRIMESTER (HCC): Primary | ICD-10-CM

## 2024-10-17 PROBLEM — O99.019 IRON DEFICIENCY ANEMIA OF PREGNANCY (HCC): Status: RESOLVED | Noted: 2020-11-02 | Resolved: 2024-10-17

## 2024-10-17 PROBLEM — O09.30 LATE PRENATAL CARE (HCC): Status: ACTIVE | Noted: 2024-10-17

## 2024-10-17 PROBLEM — D50.9 IRON DEFICIENCY ANEMIA OF PREGNANCY (HCC): Status: RESOLVED | Noted: 2020-11-02 | Resolved: 2024-10-17

## 2024-10-17 PROBLEM — O09.299: Status: ACTIVE | Noted: 2024-10-17

## 2024-10-17 LAB
ANTIBODY SCREEN: NEGATIVE
BASOPHILS # BLD AUTO: 0.03 X10(3) UL (ref 0–0.2)
BASOPHILS NFR BLD AUTO: 0.3 %
BILIRUBIN: NEGATIVE
DEPRECATED HBV CORE AB SER IA-ACNC: 11 NG/ML
EOSINOPHIL # BLD AUTO: 0.06 X10(3) UL (ref 0–0.7)
EOSINOPHIL NFR BLD AUTO: 0.7 %
ERYTHROCYTE [DISTWIDTH] IN BLOOD BY AUTOMATED COUNT: 13.9 %
GLUCOSE (URINE DIPSTICK): NEGATIVE MG/DL
HBV SURFACE AG SER-ACNC: <0.1 [IU]/L
HBV SURFACE AG SERPL QL IA: NONREACTIVE
HCT VFR BLD AUTO: 34 %
HGB BLD-MCNC: 11.4 G/DL
IMM GRANULOCYTES # BLD AUTO: 0.08 X10(3) UL (ref 0–1)
IMM GRANULOCYTES NFR BLD: 0.9 %
KETONES (URINE DIPSTICK): NEGATIVE MG/DL
LEUKOCYTES: NEGATIVE
LYMPHOCYTES # BLD AUTO: 1.51 X10(3) UL (ref 1–4)
LYMPHOCYTES NFR BLD AUTO: 17.5 %
MCH RBC QN AUTO: 28.1 PG (ref 26–34)
MCHC RBC AUTO-ENTMCNC: 33.5 G/DL (ref 31–37)
MCV RBC AUTO: 83.7 FL
MONOCYTES # BLD AUTO: 0.55 X10(3) UL (ref 0.1–1)
MONOCYTES NFR BLD AUTO: 6.4 %
MULTISTIX LOT#: NORMAL NUMERIC
NEUTROPHILS # BLD AUTO: 6.38 X10 (3) UL (ref 1.5–7.7)
NEUTROPHILS # BLD AUTO: 6.38 X10(3) UL (ref 1.5–7.7)
NEUTROPHILS NFR BLD AUTO: 74.2 %
NITRITE, URINE: NEGATIVE
OCCULT BLOOD: NEGATIVE
PH, URINE: 7 (ref 4.5–8)
PLATELET # BLD AUTO: 198 10(3)UL (ref 150–450)
PROTEIN (URINE DIPSTICK): NEGATIVE MG/DL
RBC # BLD AUTO: 4.06 X10(6)UL
RH BLOOD TYPE: POSITIVE
RUBV IGG SER QL: POSITIVE
RUBV IGG SER-ACNC: 188 IU/ML (ref 10–?)
SPECIFIC GRAVITY: 1.01 (ref 1–1.03)
T PALLIDUM AB SER QL IA: NONREACTIVE
UROBILINOGEN,SEMI-QN: 0.2 MG/DL (ref 0–1.9)
WBC # BLD AUTO: 8.6 X10(3) UL (ref 4–11)

## 2024-10-17 PROCEDURE — 81002 URINALYSIS NONAUTO W/O SCOPE: CPT | Performed by: OBSTETRICS & GYNECOLOGY

## 2024-10-17 PROCEDURE — 87086 URINE CULTURE/COLONY COUNT: CPT | Performed by: OBSTETRICS & GYNECOLOGY

## 2024-10-17 PROCEDURE — 82728 ASSAY OF FERRITIN: CPT

## 2024-10-17 PROCEDURE — 76805 OB US >/= 14 WKS SNGL FETUS: CPT | Performed by: OBSTETRICS & GYNECOLOGY

## 2024-10-17 PROCEDURE — 87591 N.GONORRHOEAE DNA AMP PROB: CPT | Performed by: OBSTETRICS & GYNECOLOGY

## 2024-10-17 PROCEDURE — 87389 HIV-1 AG W/HIV-1&-2 AB AG IA: CPT

## 2024-10-17 PROCEDURE — 87340 HEPATITIS B SURFACE AG IA: CPT

## 2024-10-17 PROCEDURE — 0500F INITIAL PRENATAL CARE VISIT: CPT | Performed by: OBSTETRICS & GYNECOLOGY

## 2024-10-17 PROCEDURE — 87491 CHLMYD TRACH DNA AMP PROBE: CPT | Performed by: OBSTETRICS & GYNECOLOGY

## 2024-10-17 PROCEDURE — 86762 RUBELLA ANTIBODY: CPT

## 2024-10-17 PROCEDURE — 36415 COLL VENOUS BLD VENIPUNCTURE: CPT

## 2024-10-17 PROCEDURE — 85025 COMPLETE CBC W/AUTO DIFF WBC: CPT

## 2024-10-17 PROCEDURE — 86780 TREPONEMA PALLIDUM: CPT

## 2024-10-17 PROCEDURE — 86803 HEPATITIS C AB TEST: CPT

## 2024-10-17 PROCEDURE — 86850 RBC ANTIBODY SCREEN: CPT

## 2024-10-17 PROCEDURE — 86901 BLOOD TYPING SEROLOGIC RH(D): CPT

## 2024-10-17 PROCEDURE — 86900 BLOOD TYPING SEROLOGIC ABO: CPT

## 2024-10-17 RX ORDER — FERROUS SULFATE 325(65) MG
325 TABLET, DELAYED RELEASE (ENTERIC COATED) ORAL
COMMUNITY
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
## 2024-10-17 NOTE — TELEPHONE ENCOUNTER
19 week 1 day soriano IUP.   Patients name &  verified with patient   Lab tubes labeled   NIPT  screen lab drawn  Patient tolerated well. Test given to PSR for processing and send out.   Patty information given and patient instructed to call in 7-10 days to discuss results. Patient verbalized understanding.

## 2024-10-18 LAB
C TRACH DNA SPEC QL NAA+PROBE: NEGATIVE
N GONORRHOEA DNA SPEC QL NAA+PROBE: NEGATIVE

## 2024-10-23 DIAGNOSIS — Z36.0 ENCOUNTER FOR ANTENATAL SCREENING FOR CHROMOSOMAL ANOMALIES (HCC): Primary | ICD-10-CM

## 2024-11-14 ENCOUNTER — ROUTINE PRENATAL (OUTPATIENT)
Facility: CLINIC | Age: 34
End: 2024-11-14
Payer: MEDICAID

## 2024-11-14 VITALS
DIASTOLIC BLOOD PRESSURE: 52 MMHG | SYSTOLIC BLOOD PRESSURE: 102 MMHG | HEART RATE: 90 BPM | WEIGHT: 131 LBS | BODY MASS INDEX: 23.21 KG/M2 | HEIGHT: 63 IN

## 2024-11-14 DIAGNOSIS — Z34.82 PRENATAL CARE, SUBSEQUENT PREGNANCY IN SECOND TRIMESTER (HCC): Primary | ICD-10-CM

## 2024-11-14 DIAGNOSIS — Z3A.23 23 WEEKS GESTATION OF PREGNANCY (HCC): ICD-10-CM

## 2024-11-14 PROCEDURE — 99214 OFFICE O/P EST MOD 30 MIN: CPT | Performed by: OBSTETRICS & GYNECOLOGY

## 2024-11-14 NOTE — PROGRESS NOTES
1st OB    Late prenatal care -19w1d  - patient had no insurance   - 1GTT and CBC ordered  - Hep c Ab- confirm test reflexed - pending       EDC by LMP c.w 19w1d US    20 wk US normal  - NIPT neg GIRL    Iron deficiency  - ferritin -11, Hb- 11.4 om 10/17  - taking PNV and extra iron

## 2024-12-10 ENCOUNTER — ROUTINE PRENATAL (OUTPATIENT)
Facility: CLINIC | Age: 34
End: 2024-12-10
Payer: MEDICAID

## 2024-12-10 VITALS
SYSTOLIC BLOOD PRESSURE: 104 MMHG | HEIGHT: 63 IN | WEIGHT: 137.38 LBS | BODY MASS INDEX: 24.34 KG/M2 | DIASTOLIC BLOOD PRESSURE: 62 MMHG | HEART RATE: 65 BPM

## 2024-12-10 DIAGNOSIS — Z3A.26 26 WEEKS GESTATION OF PREGNANCY (HCC): ICD-10-CM

## 2024-12-10 DIAGNOSIS — N90.89 PERINEAL CYST IN FEMALE: ICD-10-CM

## 2024-12-10 DIAGNOSIS — Z3A.27 27 WEEKS GESTATION OF PREGNANCY (HCC): Primary | ICD-10-CM

## 2024-12-10 DIAGNOSIS — Z34.82 PRENATAL CARE, SUBSEQUENT PREGNANCY IN SECOND TRIMESTER (HCC): Primary | ICD-10-CM

## 2024-12-10 PROCEDURE — 99214 OFFICE O/P EST MOD 30 MIN: CPT | Performed by: OBSTETRICS & GYNECOLOGY

## 2024-12-10 NOTE — PROGRESS NOTES
Patient noticed cyst at the introitus to vagina - reassured -inclusion cyst will drain at the time of delivery    Late prenatal care -19w1d  - patient had no insurance   - 1GTT and CBC  scheduled   - Hep c Ab- confirm test reflexed -  reordered as it was not done by the lab      EDC by LMP c.w 19w1d US    20 wk US normal  - NIPT neg GIRL    Iron deficiency  - ferritin -11, Hb- 11.4 om 10/17  - taking PNV and extra iron

## 2024-12-13 ENCOUNTER — LAB ENCOUNTER (OUTPATIENT)
Dept: LAB | Age: 34
End: 2024-12-13
Attending: OBSTETRICS & GYNECOLOGY
Payer: MEDICAID

## 2024-12-13 DIAGNOSIS — Z34.82 PRENATAL CARE, SUBSEQUENT PREGNANCY IN SECOND TRIMESTER (HCC): ICD-10-CM

## 2024-12-13 DIAGNOSIS — Z3A.27 27 WEEKS GESTATION OF PREGNANCY (HCC): ICD-10-CM

## 2024-12-13 LAB
BASOPHILS # BLD AUTO: 0.03 X10(3) UL (ref 0–0.2)
BASOPHILS NFR BLD AUTO: 0.4 %
EOSINOPHIL # BLD AUTO: 0.1 X10(3) UL (ref 0–0.7)
EOSINOPHIL NFR BLD AUTO: 1.4 %
ERYTHROCYTE [DISTWIDTH] IN BLOOD BY AUTOMATED COUNT: 14 %
GLUCOSE 1H P GLC SERPL-MCNC: 87 MG/DL
HCT VFR BLD AUTO: 34 %
HGB BLD-MCNC: 10.9 G/DL
IMM GRANULOCYTES # BLD AUTO: 0.06 X10(3) UL (ref 0–1)
IMM GRANULOCYTES NFR BLD: 0.9 %
LYMPHOCYTES # BLD AUTO: 1.45 X10(3) UL (ref 1–4)
LYMPHOCYTES NFR BLD AUTO: 20.6 %
MCH RBC QN AUTO: 27.9 PG (ref 26–34)
MCHC RBC AUTO-ENTMCNC: 32.1 G/DL (ref 31–37)
MCV RBC AUTO: 87.2 FL
MONOCYTES # BLD AUTO: 0.57 X10(3) UL (ref 0.1–1)
MONOCYTES NFR BLD AUTO: 8.1 %
NEUTROPHILS # BLD AUTO: 4.84 X10 (3) UL (ref 1.5–7.7)
NEUTROPHILS # BLD AUTO: 4.84 X10(3) UL (ref 1.5–7.7)
NEUTROPHILS NFR BLD AUTO: 68.6 %
PLATELET # BLD AUTO: 221 10(3)UL (ref 150–450)
RBC # BLD AUTO: 3.9 X10(6)UL
WBC # BLD AUTO: 7.1 X10(3) UL (ref 4–11)

## 2024-12-13 PROCEDURE — 82950 GLUCOSE TEST: CPT

## 2024-12-13 PROCEDURE — 85025 COMPLETE CBC W/AUTO DIFF WBC: CPT

## 2024-12-18 ENCOUNTER — LAB ENCOUNTER (OUTPATIENT)
Dept: LAB | Age: 34
End: 2024-12-18
Payer: MEDICAID

## 2024-12-18 ENCOUNTER — ROUTINE PRENATAL (OUTPATIENT)
Facility: CLINIC | Age: 34
End: 2024-12-18
Payer: MEDICAID

## 2024-12-18 VITALS
BODY MASS INDEX: 24.63 KG/M2 | SYSTOLIC BLOOD PRESSURE: 100 MMHG | HEIGHT: 63 IN | DIASTOLIC BLOOD PRESSURE: 58 MMHG | WEIGHT: 139 LBS | HEART RATE: 100 BPM

## 2024-12-18 DIAGNOSIS — Z3A.28 28 WEEKS GESTATION OF PREGNANCY (HCC): Primary | ICD-10-CM

## 2024-12-18 DIAGNOSIS — Z3A.28 28 WEEKS GESTATION OF PREGNANCY (HCC): ICD-10-CM

## 2024-12-18 LAB — T PALLIDUM AB SER QL IA: NONREACTIVE

## 2024-12-18 PROCEDURE — 87389 HIV-1 AG W/HIV-1&-2 AB AG IA: CPT

## 2024-12-18 PROCEDURE — 36415 COLL VENOUS BLD VENIPUNCTURE: CPT

## 2024-12-18 PROCEDURE — 99213 OFFICE O/P EST LOW 20 MIN: CPT

## 2024-12-18 PROCEDURE — 86780 TREPONEMA PALLIDUM: CPT

## 2024-12-18 NOTE — PROGRESS NOTES
Harlan 28w0d    She is doing well, no complaints     Late prenatal care -19w1d  - patient had no insurance   - 1GTT and CBC  done   - Hep c Ab- confirm test reflexed -  reordered as it was not done by the lab -HCV not detected   3rd tri HIV & T pal discussed and ordered  -tdap declined         EDC by LMP c.w 19w1d US     20 wk US normal  - NIPT neg GIRL     Iron deficiency  - ferritin -11, Hb- 11.4 om 10/17  - taking PNV and extra iron  -12/13- Hb 10.9  - encouraged to continue PNV and supplemental PO iron

## 2025-01-07 ENCOUNTER — ROUTINE PRENATAL (OUTPATIENT)
Facility: CLINIC | Age: 35
End: 2025-01-07
Payer: MEDICAID

## 2025-01-07 VITALS
DIASTOLIC BLOOD PRESSURE: 68 MMHG | SYSTOLIC BLOOD PRESSURE: 102 MMHG | HEIGHT: 63 IN | BODY MASS INDEX: 25.52 KG/M2 | HEART RATE: 80 BPM | WEIGHT: 144 LBS

## 2025-01-07 DIAGNOSIS — O99.019 IRON DEFICIENCY ANEMIA DURING PREGNANCY (HCC): ICD-10-CM

## 2025-01-07 DIAGNOSIS — D50.9 IRON DEFICIENCY ANEMIA DURING PREGNANCY (HCC): ICD-10-CM

## 2025-01-07 DIAGNOSIS — Z34.90 PRENATAL CARE, ANTEPARTUM (HCC): Primary | ICD-10-CM

## 2025-01-07 DIAGNOSIS — Z3A.30 30 WEEKS GESTATION OF PREGNANCY (HCC): ICD-10-CM

## 2025-01-07 PROCEDURE — 99213 OFFICE O/P EST LOW 20 MIN: CPT | Performed by: STUDENT IN AN ORGANIZED HEALTH CARE EDUCATION/TRAINING PROGRAM

## 2025-01-07 NOTE — PROGRESS NOTES
Harlan 28w0d    She is doing well, feeling tired   +FM, no LOF, no VB    Late prenatal care -19w1d  - patient had no insurance   - 1GTT and CBC  done   - Hep c Ab- confirm test reflexed -  reordered as it was not done by the lab -HCV not detected   3rd tri HIV & T pal discussed and ordered  -tdap declined         EDC by LMP c.w 19w1d US     20 wk US normal  - NIPT neg GIRL     Iron deficiency  - ferritin -11, Hb- 11.4 om 10/17  - taking PNV and extra iron  -12/13- Hb 10.9  - encouraged to continue PNV and supplemental PO iron   - repeat CBC ordered 4 weeks after her last one

## 2025-01-14 ENCOUNTER — LAB ENCOUNTER (OUTPATIENT)
Dept: LAB | Age: 35
End: 2025-01-14
Attending: STUDENT IN AN ORGANIZED HEALTH CARE EDUCATION/TRAINING PROGRAM
Payer: MEDICAID

## 2025-01-14 DIAGNOSIS — D50.9 IRON DEFICIENCY ANEMIA DURING PREGNANCY (HCC): ICD-10-CM

## 2025-01-14 DIAGNOSIS — Z34.90 PRENATAL CARE, ANTEPARTUM (HCC): ICD-10-CM

## 2025-01-14 DIAGNOSIS — O99.019 IRON DEFICIENCY ANEMIA DURING PREGNANCY (HCC): ICD-10-CM

## 2025-01-14 LAB
BASOPHILS # BLD AUTO: 0.01 X10(3) UL (ref 0–0.2)
BASOPHILS NFR BLD AUTO: 0.1 %
EOSINOPHIL # BLD AUTO: 0.05 X10(3) UL (ref 0–0.7)
EOSINOPHIL NFR BLD AUTO: 0.7 %
ERYTHROCYTE [DISTWIDTH] IN BLOOD BY AUTOMATED COUNT: 13.5 %
HCT VFR BLD AUTO: 30.9 %
HGB BLD-MCNC: 10.1 G/DL
IMM GRANULOCYTES # BLD AUTO: 0.08 X10(3) UL (ref 0–1)
IMM GRANULOCYTES NFR BLD: 1.2 %
LYMPHOCYTES # BLD AUTO: 1.27 X10(3) UL (ref 1–4)
LYMPHOCYTES NFR BLD AUTO: 18.7 %
MCH RBC QN AUTO: 28 PG (ref 26–34)
MCHC RBC AUTO-ENTMCNC: 32.7 G/DL (ref 31–37)
MCV RBC AUTO: 85.6 FL
MONOCYTES # BLD AUTO: 0.64 X10(3) UL (ref 0.1–1)
MONOCYTES NFR BLD AUTO: 9.4 %
NEUTROPHILS # BLD AUTO: 4.75 X10 (3) UL (ref 1.5–7.7)
NEUTROPHILS # BLD AUTO: 4.75 X10(3) UL (ref 1.5–7.7)
NEUTROPHILS NFR BLD AUTO: 69.9 %
PLATELET # BLD AUTO: 204 10(3)UL (ref 150–450)
RBC # BLD AUTO: 3.61 X10(6)UL
WBC # BLD AUTO: 6.8 X10(3) UL (ref 4–11)

## 2025-01-14 PROCEDURE — 36415 COLL VENOUS BLD VENIPUNCTURE: CPT

## 2025-01-14 PROCEDURE — 85025 COMPLETE CBC W/AUTO DIFF WBC: CPT

## 2025-01-14 NOTE — PROGRESS NOTES
Patient aware of lab result and recommendation: \"Recommend IV iron.\" Will call pt once determination received by her insurance. Patient verbalized understanding, agreed to and intend to comply with plan of care.    RN called Medicaid [facility] [phone] IV iron does not need PA # [id number]. Order faxed to [facility]. Patient notified.     Haleigh Hicks,    An order for IV iron was faxed to the [facility]. You may call them to schedule an appointment at [phone] if you have not heard from them in 3-4 days.     Let us know if you have futher questions,  CARLO Alvarez

## 2025-01-16 ENCOUNTER — TELEPHONE (OUTPATIENT)
Facility: CLINIC | Age: 35
End: 2025-01-16

## 2025-01-16 PROBLEM — O99.013 ANEMIA COMPLICATING PREGNANCY IN THIRD TRIMESTER (HCC): Status: ACTIVE | Noted: 2025-01-16

## 2025-01-16 NOTE — TELEPHONE ENCOUNTER
IV iron does not need PA. Patient can reach out to [facility] to schedule an appointment. Patient verbalized understanding, agreed to and intend to comply with plan of care.

## 2025-01-21 ENCOUNTER — OFFICE VISIT (OUTPATIENT)
Age: 35
End: 2025-01-21
Payer: MEDICAID

## 2025-01-21 VITALS
OXYGEN SATURATION: 98 % | TEMPERATURE: 98 F | RESPIRATION RATE: 16 BRPM | BODY MASS INDEX: 25.37 KG/M2 | WEIGHT: 143.19 LBS | HEART RATE: 80 BPM | HEIGHT: 62.99 IN | SYSTOLIC BLOOD PRESSURE: 105 MMHG | DIASTOLIC BLOOD PRESSURE: 67 MMHG

## 2025-01-21 DIAGNOSIS — O99.013 ANEMIA COMPLICATING PREGNANCY IN THIRD TRIMESTER (HCC): Primary | ICD-10-CM

## 2025-01-21 NOTE — PROGRESS NOTES
Pt here for Venofer . Pt denies any issues or concerns.      Ordering Provider: dr fried  Order Exp: 2-15-25     Pt tolerated infusion without difficulty or complaint. Reviewed next apt date/time: 2-23-25      Education Record  Learner:  Patient  Disease / Diagnosis: EDWARD  Barriers / Limitations:  None  Method:  Brief focused  General Topics:  Plan of care reviewed  Outcome:  Shows understanding

## 2025-01-23 ENCOUNTER — OFFICE VISIT (OUTPATIENT)
Age: 35
End: 2025-01-23
Payer: MEDICAID

## 2025-01-23 ENCOUNTER — ROUTINE PRENATAL (OUTPATIENT)
Facility: CLINIC | Age: 35
End: 2025-01-23
Payer: MEDICAID

## 2025-01-23 VITALS
OXYGEN SATURATION: 100 % | SYSTOLIC BLOOD PRESSURE: 105 MMHG | HEART RATE: 90 BPM | TEMPERATURE: 97 F | DIASTOLIC BLOOD PRESSURE: 69 MMHG | RESPIRATION RATE: 16 BRPM

## 2025-01-23 VITALS
WEIGHT: 147.38 LBS | DIASTOLIC BLOOD PRESSURE: 66 MMHG | BODY MASS INDEX: 26.11 KG/M2 | HEART RATE: 79 BPM | SYSTOLIC BLOOD PRESSURE: 104 MMHG | HEIGHT: 63 IN

## 2025-01-23 DIAGNOSIS — Z3A.33 33 WEEKS GESTATION OF PREGNANCY (HCC): ICD-10-CM

## 2025-01-23 DIAGNOSIS — Z34.83 PRENATAL CARE, SUBSEQUENT PREGNANCY IN THIRD TRIMESTER (HCC): Primary | ICD-10-CM

## 2025-01-23 DIAGNOSIS — O99.013 ANEMIA COMPLICATING PREGNANCY IN THIRD TRIMESTER (HCC): Primary | ICD-10-CM

## 2025-01-23 PROCEDURE — 99214 OFFICE O/P EST MOD 30 MIN: CPT | Performed by: OBSTETRICS & GYNECOLOGY

## 2025-01-23 NOTE — PROGRESS NOTES
Education Record    Learner:  Patient    Disease / Diagnosis: Anemia in pregnancy    Barriers / Limitations:  None   Comments:    Method:  Discussion   Comments:    General Topics:  Medication, Side effects and symptom management, Plan of care reviewed, and Fall risk and prevention   Comments:    Outcome:  Shows understanding   Comments:    Venofer iron infusion tolerated well. Discharged ambulatory in stable condition.

## 2025-01-23 NOTE — PROGRESS NOTES
33w1    Late prenatal care -19w1d  - patient had no insurance   - 1GTT and CBC  done   - Hep c Ab- confirm test reflexed -  reordered as it was not done by the lab -HCV not detected   3rd tri HIV & T pal discussed and ordered  -tdap declined         EDC by LMP c.w 19w1d US     20 wk US normal  - NIPT neg GIRL     Iron deficiency  - ferritin -11, Hb- 11.4 om 10/17  - taking PNV and extra iron  -12/13- Hb 10.9  - dropped to 10.1, received 1 dose of IV iron - 4 more scheduled

## 2025-01-28 ENCOUNTER — APPOINTMENT (OUTPATIENT)
Age: 35
End: 2025-01-28
Payer: MEDICAID

## 2025-01-31 ENCOUNTER — OFFICE VISIT (OUTPATIENT)
Age: 35
End: 2025-01-31
Payer: MEDICAID

## 2025-01-31 VITALS
DIASTOLIC BLOOD PRESSURE: 58 MMHG | SYSTOLIC BLOOD PRESSURE: 101 MMHG | RESPIRATION RATE: 16 BRPM | HEART RATE: 80 BPM | TEMPERATURE: 98 F | OXYGEN SATURATION: 100 %

## 2025-01-31 DIAGNOSIS — O99.013 ANEMIA COMPLICATING PREGNANCY IN THIRD TRIMESTER (HCC): Primary | ICD-10-CM

## 2025-01-31 NOTE — PROGRESS NOTES
Pt here for Venofer 3 of 5. Pt denies any issues or concerns.      Ordering Provider: Paulina  Order Exp: 2 of 5 doses remain     Pt tolerated infusion without difficulty or complaint. Reviewed next apt date/time: yes, 2/3 & 2/5. Uses MyChart.      Education Record  Learner:  Patient  Disease / Diagnosis: Anemia in pregnancy  Barriers / Limitations:  None  Method:  Brief focused and Discussion  General Topics:  Medication, Side effects and symptom management, and Plan of care reviewed  Outcome:  Shows understanding    Discharged home in stable condition, no new complaints.

## 2025-02-04 NOTE — PROGRESS NOTES
35w0d    Doing well. + FM, no Ucx, VB, LOF. Has been getting IV iron infusions, has two more scheduled.      EDC by LMP c.w 19w1d US     20 wk US normal  - NIPT neg GIRL    Late prenatal care -19w1d  - patient had no insurance   - 1GTT and CBC  done   - Hep c Ab- confirm test reflexed -  reordered as it was not done by the lab -HCV not detected   3rd tri HIV & T pal done  -tdap declined      Iron deficiency  - ferritin -11, Hb- 11.4 om 10/17  - taking PNV and extra iron  -12/13- Hb 10.9  - dropped to 10.1, received 3 doses of IV iron - 2 more scheduled      RTC 1 week with GBS

## 2025-02-05 ENCOUNTER — OFFICE VISIT (OUTPATIENT)
Age: 35
End: 2025-02-05
Payer: MEDICAID

## 2025-02-05 ENCOUNTER — ROUTINE PRENATAL (OUTPATIENT)
Facility: CLINIC | Age: 35
End: 2025-02-05
Payer: MEDICAID

## 2025-02-05 VITALS
TEMPERATURE: 97 F | DIASTOLIC BLOOD PRESSURE: 64 MMHG | OXYGEN SATURATION: 99 % | SYSTOLIC BLOOD PRESSURE: 109 MMHG | RESPIRATION RATE: 16 BRPM | HEART RATE: 69 BPM

## 2025-02-05 VITALS
BODY MASS INDEX: 26.11 KG/M2 | DIASTOLIC BLOOD PRESSURE: 60 MMHG | HEIGHT: 63 IN | HEART RATE: 79 BPM | SYSTOLIC BLOOD PRESSURE: 104 MMHG | WEIGHT: 147.38 LBS

## 2025-02-05 DIAGNOSIS — Z34.83 ENCOUNTER FOR SUPERVISION OF OTHER NORMAL PREGNANCY IN THIRD TRIMESTER (HCC): Primary | ICD-10-CM

## 2025-02-05 DIAGNOSIS — O99.013 ANEMIA COMPLICATING PREGNANCY IN THIRD TRIMESTER (HCC): Primary | ICD-10-CM

## 2025-02-05 DIAGNOSIS — Z3A.35 35 WEEKS GESTATION OF PREGNANCY (HCC): ICD-10-CM

## 2025-02-05 PROCEDURE — 99213 OFFICE O/P EST LOW 20 MIN: CPT | Performed by: STUDENT IN AN ORGANIZED HEALTH CARE EDUCATION/TRAINING PROGRAM

## 2025-02-05 NOTE — PROGRESS NOTES
Education Record    Learner:  Patient    Disease / Diagnosis: anemia in pregnancy    Barriers / Limitations:  None   Comments:    Method:  Discussion   Comments:    General Topics:  Medication, Side effects and symptom management, Plan of care reviewed, and Fall risk and prevention   Comments:    Outcome:  Shows understanding   Comments:    Venofer iron infusion tolerated well. Pt discharged ambulatory in stable condition.

## 2025-02-07 ENCOUNTER — OFFICE VISIT (OUTPATIENT)
Age: 35
End: 2025-02-07
Payer: MEDICAID

## 2025-02-07 VITALS
RESPIRATION RATE: 16 BRPM | DIASTOLIC BLOOD PRESSURE: 65 MMHG | HEIGHT: 62.99 IN | WEIGHT: 148.38 LBS | HEART RATE: 77 BPM | TEMPERATURE: 97 F | BODY MASS INDEX: 26.29 KG/M2 | OXYGEN SATURATION: 99 % | SYSTOLIC BLOOD PRESSURE: 112 MMHG

## 2025-02-07 DIAGNOSIS — O99.013 ANEMIA COMPLICATING PREGNANCY IN THIRD TRIMESTER (HCC): Primary | ICD-10-CM

## 2025-02-07 NOTE — PROGRESS NOTES
Pt here for venofer . Pt denies any issues or concerns.      Ordering Provider: Paulina  Order Exp: last dose     Pt tolerated infusion without difficulty or complaint. Reviewed next apt date/time: n/a      Education Record  Learner:  Patient  Disease / Diagnosis: EDWARD with pregnancy  Barriers / Limitations:  None  Method:  Discussion  General Topics:  Medication  Outcome:  Shows understanding    Here for last venofer. Tolerated well.

## 2025-02-13 ENCOUNTER — ROUTINE PRENATAL (OUTPATIENT)
Facility: CLINIC | Age: 35
End: 2025-02-13
Payer: MEDICAID

## 2025-02-13 VITALS
HEIGHT: 63 IN | SYSTOLIC BLOOD PRESSURE: 118 MMHG | HEART RATE: 94 BPM | DIASTOLIC BLOOD PRESSURE: 58 MMHG | BODY MASS INDEX: 26.75 KG/M2 | WEIGHT: 151 LBS

## 2025-02-13 DIAGNOSIS — Z34.90 PRENATAL CARE, ANTEPARTUM (HCC): Primary | ICD-10-CM

## 2025-02-13 PROCEDURE — 87150 DNA/RNA AMPLIFIED PROBE: CPT | Performed by: STUDENT IN AN ORGANIZED HEALTH CARE EDUCATION/TRAINING PROGRAM

## 2025-02-13 PROCEDURE — 99212 OFFICE O/P EST SF 10 MIN: CPT | Performed by: STUDENT IN AN ORGANIZED HEALTH CARE EDUCATION/TRAINING PROGRAM

## 2025-02-13 PROCEDURE — 87081 CULTURE SCREEN ONLY: CPT | Performed by: STUDENT IN AN ORGANIZED HEALTH CARE EDUCATION/TRAINING PROGRAM

## 2025-02-13 NOTE — PROGRESS NOTES
36w1d     Pt doing well, no complaints  Active fetal movement  SVE - closed but feels vertex by leopolds     EDC by LMP c.w 19w1d US     20 wk US normal  - NIPT neg GIRL    Late prenatal care -19w1d  - patient had no insurance   - 1GTT and CBC  done   - Hep c Ab- confirm test reflexed -  reordered as it was not done by the lab -HCV not detected   3rd tri HIV & T pal done  -tdap declined   -GBS done     Iron deficiency  - ferritin -11, Hb- 11.4 om 10/17  - taking PNV and extra iron  -12/13- Hb 10.9  - dropped to 10.1, received 5x IV Fe

## 2025-02-15 LAB — GROUP B STREP BY PCR FOR PCR OVT: NEGATIVE

## 2025-02-21 ENCOUNTER — ROUTINE PRENATAL (OUTPATIENT)
Facility: CLINIC | Age: 35
End: 2025-02-21
Payer: MEDICAID

## 2025-02-21 VITALS
DIASTOLIC BLOOD PRESSURE: 68 MMHG | HEART RATE: 78 BPM | HEIGHT: 63 IN | BODY MASS INDEX: 27.18 KG/M2 | WEIGHT: 153.38 LBS | SYSTOLIC BLOOD PRESSURE: 112 MMHG

## 2025-02-21 DIAGNOSIS — Z34.90 PRENATAL CARE, ANTEPARTUM (HCC): Primary | ICD-10-CM

## 2025-02-21 PROCEDURE — 99212 OFFICE O/P EST SF 10 MIN: CPT | Performed by: STUDENT IN AN ORGANIZED HEALTH CARE EDUCATION/TRAINING PROGRAM

## 2025-02-21 NOTE — PROGRESS NOTES
37w2d     Doing well, some irregular/inconsistent mild CTX  Baby moving a lot  Declines SVE today - closed last wk but felt vertex     EDC by LMP c.w 19w1d US     20 wk US normal  - NIPT neg GIRL    Late prenatal care -19w1d  - patient had no insurance   - 1GTT and CBC  done   - Hep c Ab- confirm test reflexed -  reordered as it was not done by the lab -HCV not detected   3rd tri HIV & T pal done  -tdap declined   -GBS done     Iron deficiency  - ferritin -11, Hb- 11.4 om 10/17  - taking PNV and extra iron  -12/13- Hb 10.9  - dropped to 10.1, received 5x IV Fe

## 2025-02-28 ENCOUNTER — ROUTINE PRENATAL (OUTPATIENT)
Facility: CLINIC | Age: 35
End: 2025-02-28
Payer: MEDICAID

## 2025-02-28 VITALS
BODY MASS INDEX: 27.11 KG/M2 | HEIGHT: 63 IN | DIASTOLIC BLOOD PRESSURE: 62 MMHG | HEART RATE: 76 BPM | SYSTOLIC BLOOD PRESSURE: 112 MMHG | WEIGHT: 153 LBS

## 2025-02-28 DIAGNOSIS — Z34.83 ENCOUNTER FOR SUPERVISION OF OTHER NORMAL PREGNANCY IN THIRD TRIMESTER (HCC): Primary | ICD-10-CM

## 2025-02-28 DIAGNOSIS — Z3A.38 38 WEEKS GESTATION OF PREGNANCY (HCC): ICD-10-CM

## 2025-02-28 PROCEDURE — 99213 OFFICE O/P EST LOW 20 MIN: CPT | Performed by: STUDENT IN AN ORGANIZED HEALTH CARE EDUCATION/TRAINING PROGRAM

## 2025-02-28 NOTE — PROGRESS NOTES
38w2d     Doing well, some irregular/inconsistent mild CTX. + FM, no LOF or VB  SVE done today, feels fingertip at most/40/-4  Would like to wait until spontaneous labor but will think about induction next week     EDC by LMP c.w 19w1d US     20 wk US normal  - NIPT neg GIRL    Late prenatal care -19w1d  - patient had no insurance   - 1GTT and CBC  done   - Hep c Ab- confirm test reflexed -  reordered as it was not done by the lab -HCV not detected   3rd tri HIV & T pal done  -tdap declined   -GBS done     Iron deficiency  - ferritin -11, Hb- 11.4 om 10/17  - taking PNV and extra iron  -12/13- Hb 10.9  - dropped to 10.1, received 5x IV Fe

## 2025-03-07 ENCOUNTER — TELEPHONE (OUTPATIENT)
Facility: CLINIC | Age: 35
End: 2025-03-07

## 2025-03-07 ENCOUNTER — ROUTINE PRENATAL (OUTPATIENT)
Facility: CLINIC | Age: 35
End: 2025-03-07
Payer: MEDICAID

## 2025-03-07 VITALS
BODY MASS INDEX: 27.46 KG/M2 | HEART RATE: 91 BPM | SYSTOLIC BLOOD PRESSURE: 102 MMHG | WEIGHT: 155 LBS | DIASTOLIC BLOOD PRESSURE: 52 MMHG | HEIGHT: 63 IN

## 2025-03-07 DIAGNOSIS — Z34.90 PRENATAL CARE, ANTEPARTUM (HCC): Primary | ICD-10-CM

## 2025-03-07 PROCEDURE — 99212 OFFICE O/P EST SF 10 MIN: CPT | Performed by: STUDENT IN AN ORGANIZED HEALTH CARE EDUCATION/TRAINING PROGRAM

## 2025-03-07 NOTE — PROGRESS NOTES
CARIDAD - 39w2d     Pt having irregular erick mello contractions  Baby active  Wants to wait for labor, ok with scheduling IOL for later in 40th week  SVE - vsygv2iu/40/-3, feels vertex     EDC by LMP c.w 19w1d US     20 wk US normal  - NIPT neg GIRL    Late prenatal care -19w1d  - patient had no insurance   - 1GTT and CBC  done   - Hep c Ab- confirm test reflexed -  reordered as it was not done by the lab -HCV not detected   3rd tri HIV & T pal done  -tdap declined   -GBS done     Iron deficiency  - ferritin -11, Hb- 11.4 om 10/17  - taking PNV and extra iron  -12/13- Hb 10.9  - dropped to 10.1, received 5x IV Fe

## 2025-03-10 ENCOUNTER — ROUTINE PRENATAL (OUTPATIENT)
Facility: CLINIC | Age: 35
End: 2025-03-10
Payer: MEDICAID

## 2025-03-10 VITALS
DIASTOLIC BLOOD PRESSURE: 58 MMHG | WEIGHT: 153 LBS | HEIGHT: 63 IN | HEART RATE: 87 BPM | SYSTOLIC BLOOD PRESSURE: 102 MMHG | BODY MASS INDEX: 27.11 KG/M2

## 2025-03-10 DIAGNOSIS — Z3A.39 39 WEEKS GESTATION OF PREGNANCY (HCC): ICD-10-CM

## 2025-03-10 DIAGNOSIS — Z34.83 PRENATAL CARE, SUBSEQUENT PREGNANCY IN THIRD TRIMESTER (HCC): Primary | ICD-10-CM

## 2025-03-10 NOTE — PROGRESS NOTES
CARIDAD - 39w5d     Pt having irregular erick mello contractions  Baby active  IOL 3/12/25 8:30 am          EDC by LMP c.w 19w1d US     20 wk US normal  - NIPT neg GIRL    Late prenatal care -19w1d  - patient had no insurance   - 1GTT and CBC  done   - Hep c Ab- confirm test reflexed -  reordered as it was not done by the lab -HCV not detected   3rd tri HIV & T pal done  -tdap declined   -GBS done     Iron deficiency  - ferritin -11, Hb- 11.4 om 10/17  - taking PNV and extra iron  -12/13- Hb 10.9  - dropped to 10.1, received 5x IV Fe

## 2025-03-11 ENCOUNTER — TELEPHONE (OUTPATIENT)
Facility: CLINIC | Age: 35
End: 2025-03-11

## 2025-03-11 NOTE — TELEPHONE ENCOUNTER
Patient scheduled to be induced tomorrow 03/12, believes she lost her mucus plug/water broke, would like to speak with RN.

## 2025-03-11 NOTE — TELEPHONE ENCOUNTER
Spoke with patient. Patient states she lost her mucus plug. She states with her last pregnancy things start to move pretty fast after losing her mucus plug. Denies leaking of fluids and contractions. Has a little cramping. She is wondering if she still needs to go to her induction appointment. Advised as long as she doesn't want to see if she can push it out, she can still go to her scheduled induction. However, if she progresses over night, she can go in prior. Understanding verbalized.

## 2025-03-12 ENCOUNTER — ANESTHESIA (OUTPATIENT)
Dept: OBGYN UNIT | Facility: HOSPITAL | Age: 35
End: 2025-03-12
Payer: MEDICAID

## 2025-03-12 ENCOUNTER — APPOINTMENT (OUTPATIENT)
Dept: OBGYN CLINIC | Facility: HOSPITAL | Age: 35
End: 2025-03-12
Payer: MEDICAID

## 2025-03-12 ENCOUNTER — HOSPITAL ENCOUNTER (INPATIENT)
Facility: HOSPITAL | Age: 35
LOS: 1 days | Discharge: HOME | End: 2025-03-13
Attending: OBSTETRICS & GYNECOLOGY | Admitting: OBSTETRICS & GYNECOLOGY
Payer: MEDICAID

## 2025-03-12 ENCOUNTER — ANESTHESIA EVENT (OUTPATIENT)
Dept: OBGYN UNIT | Facility: HOSPITAL | Age: 35
End: 2025-03-12
Payer: MEDICAID

## 2025-03-12 PROBLEM — Z34.90 PREGNANCY (HCC): Status: ACTIVE | Noted: 2025-03-12

## 2025-03-12 PROBLEM — O48.0 POST-TERM PREGNANCY, 40-42 WEEKS OF GESTATION (HCC): Status: ACTIVE | Noted: 2025-03-12

## 2025-03-12 PROBLEM — O09.33 LATE PRENATAL CARE AFFECTING PREGNANCY IN THIRD TRIMESTER (HCC): Status: ACTIVE | Noted: 2024-10-17

## 2025-03-12 PROBLEM — Z34.90 ENCOUNTER FOR INDUCTION OF LABOR (HCC): Status: ACTIVE | Noted: 2025-03-12

## 2025-03-12 LAB
ANTIBODY SCREEN: NEGATIVE
BASOPHILS # BLD AUTO: 0.03 X10(3) UL (ref 0–0.2)
BASOPHILS NFR BLD AUTO: 0.3 %
EOSINOPHIL # BLD AUTO: 0.02 X10(3) UL (ref 0–0.7)
EOSINOPHIL NFR BLD AUTO: 0.2 %
ERYTHROCYTE [DISTWIDTH] IN BLOOD BY AUTOMATED COUNT: 15.4 %
HCT VFR BLD AUTO: 39.5 %
HGB BLD-MCNC: 12.8 G/DL
IMM GRANULOCYTES # BLD AUTO: 0.09 X10(3) UL (ref 0–1)
IMM GRANULOCYTES NFR BLD: 1 %
LYMPHOCYTES # BLD AUTO: 1.45 X10(3) UL (ref 1–4)
LYMPHOCYTES NFR BLD AUTO: 16.9 %
MCH RBC QN AUTO: 28 PG (ref 26–34)
MCHC RBC AUTO-ENTMCNC: 32.4 G/DL (ref 31–37)
MCV RBC AUTO: 86.4 FL
MONOCYTES # BLD AUTO: 0.64 X10(3) UL (ref 0.1–1)
MONOCYTES NFR BLD AUTO: 7.4 %
NEUTROPHILS # BLD AUTO: 6.37 X10 (3) UL (ref 1.5–7.7)
NEUTROPHILS # BLD AUTO: 6.37 X10(3) UL (ref 1.5–7.7)
NEUTROPHILS NFR BLD AUTO: 74.2 %
PLATELET # BLD AUTO: 207 10(3)UL (ref 150–450)
RBC # BLD AUTO: 4.57 X10(6)UL
RH BLOOD TYPE: POSITIVE
T PALLIDUM AB SER QL IA: NONREACTIVE
WBC # BLD AUTO: 8.6 X10(3) UL (ref 4–11)

## 2025-03-12 PROCEDURE — 3E033VJ INTRODUCTION OF OTHER HORMONE INTO PERIPHERAL VEIN, PERCUTANEOUS APPROACH: ICD-10-PCS | Performed by: OBSTETRICS & GYNECOLOGY

## 2025-03-12 PROCEDURE — 10907ZC DRAINAGE OF AMNIOTIC FLUID, THERAPEUTIC FROM PRODUCTS OF CONCEPTION, VIA NATURAL OR ARTIFICIAL OPENING: ICD-10-PCS | Performed by: OBSTETRICS & GYNECOLOGY

## 2025-03-12 PROCEDURE — 59409 OBSTETRICAL CARE: CPT | Performed by: OBSTETRICS & GYNECOLOGY

## 2025-03-12 RX ORDER — SIMETHICONE 80 MG
80 TABLET,CHEWABLE ORAL 3 TIMES DAILY PRN
Status: DISCONTINUED | OUTPATIENT
Start: 2025-03-12 | End: 2025-03-13

## 2025-03-12 RX ORDER — BISACODYL 10 MG
10 SUPPOSITORY, RECTAL RECTAL ONCE AS NEEDED
Status: DISCONTINUED | OUTPATIENT
Start: 2025-03-12 | End: 2025-03-13

## 2025-03-12 RX ORDER — LIDOCAINE HYDROCHLORIDE AND EPINEPHRINE 15; 5 MG/ML; UG/ML
5 INJECTION, SOLUTION EPIDURAL AS NEEDED
Status: DISCONTINUED | OUTPATIENT
Start: 2025-03-12 | End: 2025-03-13

## 2025-03-12 RX ORDER — SODIUM CHLORIDE, SODIUM LACTATE, POTASSIUM CHLORIDE, CALCIUM CHLORIDE 600; 310; 30; 20 MG/100ML; MG/100ML; MG/100ML; MG/100ML
INJECTION, SOLUTION INTRAVENOUS CONTINUOUS
Status: DISCONTINUED | OUTPATIENT
Start: 2025-03-12 | End: 2025-03-12 | Stop reason: HOSPADM

## 2025-03-12 RX ORDER — NALBUPHINE HYDROCHLORIDE 10 MG/ML
2.5 INJECTION INTRAMUSCULAR; INTRAVENOUS; SUBCUTANEOUS
Status: DISCONTINUED | OUTPATIENT
Start: 2025-03-12 | End: 2025-03-13

## 2025-03-12 RX ORDER — SODIUM CHLORIDE 9 MG/ML
10 INJECTION, SOLUTION INTRAMUSCULAR; INTRAVENOUS; SUBCUTANEOUS AS NEEDED
Status: DISCONTINUED | OUTPATIENT
Start: 2025-03-12 | End: 2025-03-13

## 2025-03-12 RX ORDER — TERBUTALINE SULFATE 1 MG/ML
0.25 INJECTION SUBCUTANEOUS AS NEEDED
Status: DISCONTINUED | OUTPATIENT
Start: 2025-03-12 | End: 2025-03-12 | Stop reason: HOSPADM

## 2025-03-12 RX ORDER — BUPIVACAINE HYDROCHLORIDE 2.5 MG/ML
30 INJECTION, SOLUTION EPIDURAL; INFILTRATION; INTRACAUDAL; PERINEURAL AS NEEDED
Status: DISCONTINUED | OUTPATIENT
Start: 2025-03-12 | End: 2025-03-13

## 2025-03-12 RX ORDER — IBUPROFEN 600 MG/1
600 TABLET, FILM COATED ORAL ONCE AS NEEDED
Status: COMPLETED | OUTPATIENT
Start: 2025-03-12 | End: 2025-03-12

## 2025-03-12 RX ORDER — ACETAMINOPHEN 500 MG
1000 TABLET ORAL EVERY 6 HOURS PRN
Status: DISCONTINUED | OUTPATIENT
Start: 2025-03-12 | End: 2025-03-12

## 2025-03-12 RX ORDER — BUPIVACAINE HCL/0.9 % NACL/PF 0.25 %
5 PLASTIC BAG, INJECTION (ML) EPIDURAL AS NEEDED
Status: DISCONTINUED | OUTPATIENT
Start: 2025-03-12 | End: 2025-03-13

## 2025-03-12 RX ORDER — ACETAMINOPHEN 500 MG
500 TABLET ORAL EVERY 6 HOURS PRN
Status: DISCONTINUED | OUTPATIENT
Start: 2025-03-12 | End: 2025-03-13

## 2025-03-12 RX ORDER — LIDOCAINE HYDROCHLORIDE 20 MG/ML
5 INJECTION, SOLUTION EPIDURAL; INFILTRATION; INTRACAUDAL; PERINEURAL AS NEEDED
Status: DISCONTINUED | OUTPATIENT
Start: 2025-03-12 | End: 2025-03-13

## 2025-03-12 RX ORDER — IBUPROFEN 600 MG/1
600 TABLET, FILM COATED ORAL EVERY 6 HOURS
Status: DISCONTINUED | OUTPATIENT
Start: 2025-03-12 | End: 2025-03-13

## 2025-03-12 RX ORDER — DOCUSATE SODIUM 100 MG/1
100 CAPSULE, LIQUID FILLED ORAL
Status: DISCONTINUED | OUTPATIENT
Start: 2025-03-12 | End: 2025-03-13

## 2025-03-12 RX ORDER — ACETAMINOPHEN 500 MG
1000 TABLET ORAL EVERY 6 HOURS PRN
Status: DISCONTINUED | OUTPATIENT
Start: 2025-03-12 | End: 2025-03-13

## 2025-03-12 RX ORDER — ROPIVACAINE HYDROCHLORIDE 5 MG/ML
30 INJECTION, SOLUTION EPIDURAL; INFILTRATION; PERINEURAL AS NEEDED
Status: DISCONTINUED | OUTPATIENT
Start: 2025-03-12 | End: 2025-03-12 | Stop reason: HOSPADM

## 2025-03-12 RX ORDER — ACETAMINOPHEN 500 MG
500 TABLET ORAL EVERY 6 HOURS PRN
Status: DISCONTINUED | OUTPATIENT
Start: 2025-03-12 | End: 2025-03-12

## 2025-03-12 RX ORDER — ONDANSETRON 2 MG/ML
4 INJECTION INTRAMUSCULAR; INTRAVENOUS EVERY 6 HOURS PRN
Status: DISCONTINUED | OUTPATIENT
Start: 2025-03-12 | End: 2025-03-12 | Stop reason: HOSPADM

## 2025-03-12 RX ORDER — AMMONIA 15 % (W/V)
0.3 AMPUL (EA) INHALATION AS NEEDED
Status: DISCONTINUED | OUTPATIENT
Start: 2025-03-12 | End: 2025-03-13

## 2025-03-12 RX ORDER — DEXTROSE, SODIUM CHLORIDE, SODIUM LACTATE, POTASSIUM CHLORIDE, AND CALCIUM CHLORIDE 5; .6; .31; .03; .02 G/100ML; G/100ML; G/100ML; G/100ML; G/100ML
INJECTION, SOLUTION INTRAVENOUS AS NEEDED
Status: DISCONTINUED | OUTPATIENT
Start: 2025-03-12 | End: 2025-03-12 | Stop reason: HOSPADM

## 2025-03-12 RX ORDER — CITRIC ACID/SODIUM CITRATE 334-500MG
30 SOLUTION, ORAL ORAL AS NEEDED
Status: DISCONTINUED | OUTPATIENT
Start: 2025-03-12 | End: 2025-03-12 | Stop reason: HOSPADM

## 2025-03-12 RX ORDER — LIDOCAINE HYDROCHLORIDE AND EPINEPHRINE 15; 5 MG/ML; UG/ML
INJECTION, SOLUTION EPIDURAL AS NEEDED
Status: DISCONTINUED | OUTPATIENT
Start: 2025-03-12 | End: 2025-03-12 | Stop reason: SURG

## 2025-03-12 RX ADMIN — LIDOCAINE HYDROCHLORIDE AND EPINEPHRINE 2 ML: 15; 5 INJECTION, SOLUTION EPIDURAL at 10:58:00

## 2025-03-12 RX ADMIN — LIDOCAINE HYDROCHLORIDE AND EPINEPHRINE 3 ML: 15; 5 INJECTION, SOLUTION EPIDURAL at 10:56:00

## 2025-03-12 NOTE — L&D DELIVERY NOTE
Ke Girl [[id number]]      Labor Events     labor?: No   steroids?: None  Antibiotics received during labor?: No  Rupture date/time: 3/12/2025 0948     Rupture type: AROM  Fluid color: Clear  Labor type: Induced Onset of Labor  Induction: Oxytocin, AROM  Indications for induction: Elective  Intrapartum & labor complications: Other - see comments, Precipitous labor  Intrapartum & labor complications comment: Late prenatal care       Labor Length    1st stage: 1h 20m  2nd stage: 0h 17m  3rd stage: 0h 04m       Labor Event Times    Labor onset date/time: 3/12/2025 1145  Dilation complete date/time: 3/12/2025 1305  Start pushing date/time: 3/12/2025 1316        Presentation    Presentation: Vertex  Position: Right Occiput Anterior       Operative Delivery    Operative Vaginal Delivery: No                Shoulder Dystocia    Shoulder Dystocia: No       Anesthesia    Method: Epidural              Parnell Delivery      Head delivery date/time: 3/12/2025 13:21:38   Delivery date/time:  3/12/25 13:22:05   Delivery type: Normal spontaneous vaginal delivery    Details:     Delivery location: delivery room       Delivery Providers    Delivering Clinician: Susu Pineda MD   Delivery personnel:  Provider Role   Savana Lind RN Baby Nurse   Daniela Jacques RN Delivery Nurse             Cord    Vessels: 3 Vessels  Complications: Nuchal  # of loops: 1  Timed cord clamping: Yes  Time in sec: 30  Cord blood disposition: to lab  Gases sent?: No       Resuscitation    Method: Suctioning       Parnell Measurements      Weight: 3490 g 7 lb 11.1 oz Length: 52.1 cm     Head circum.: 33.5 cm Chest circum.: 33 cm      Abdominal circum.: 34.5 cm           Placenta    Date/time: 3/12/2025 1326  Removal: Spontaneous  Appearance: Intact  Disposition: held for future pathology       Apgars    Living status: Living   Apgar Scoring Key:    0 1 2    Skin color Blue or pale Acrocyanotic Completely pink     Heart rate Absent <100 bpm >100 bpm    Reflex irritability No response Grimace Cry or active withdrawal    Muscle tone Limp Some flexion Active motion    Respiratory effort Absent Weak cry; hypoventilation Good, crying              1 Minute:  5 Minute:  10 Minute:  15 Minute:  20 Minute:      Skin color: 1  1       Heart rate: 2  2       Reflex irritablity: 2  2       Muscle tone: 2  2       Respiratory effort: 2  2       Total: 9  9          Apgars assigned by: BRISSA CAMEJO RN   disposition: with mother       Skin to Skin    Skin to skin initiated date/time: 3/12/2025 1322  Skin to skin with: Mother       Vaginal Count    Initial count RN: Daniela Jacques RN  Initial count Tech: Correa, Cynthia   Sponges   Sharps    Initial counts 11   0    Final counts 11   0    Final count RN: Daniela Jacques RN  Final count MD: Susu Pineda MD       Lacerations    Episiotomy: None  Perineal lacerations: None      Vaginal laceration?: No      Cervical laceration?: No    Clitoral laceration?: No    Quantitative blood loss (mL): 45              34 year old  at 40w0d was admitted for induction of labor for dates.     Pregnancy significant for Late prenatal care - 1st visit at 19w1d on 10/17/24. Reports had no insurance -> Medicaid. Iron deficiency anemia s/p IV iron x 5 doses (25-25).     Amniotomy, IV oxytocin, epidural. She progressed to complete. Pushed with good efforts. Delivered a viable female infant via normal spontaneous vaginal delivery over an intact perineum via vertex presentation KHOA position. Nuchal cord x 1 delivered through. Delayed cord clamping was performed. Cord blood obtained. IV oxytocin administered. Placenta expressed apparently intact. Uterine tone was good. Perineum inspected. Laceration(s): none. See Delivery report for QBL or EBL.      The American College of Obstetricians and Gynecologists (ACOG) defines postpartum hemorrhage (PPH) as cumulative blood loss > 1,000 mL or blood  loss accompanied by signs or symptoms of hypovolemia within 24 hours after the birth process (includes intrapartum loss) regardless of route of delivery    The patient is stable, asymptomatic, and blood loss is within the expected amount following delivery. Standard treatment provided to prevent postpartum hemorrhage. Patient does not meet ACOG criteria for hemorrhage at this time.    Susu Pineda MD

## 2025-03-12 NOTE — PLAN OF CARE
Problem: POSTPARTUM  Goal: Long Term Goal:Experiences normal postpartum course  Description: INTERVENTIONS:- Assess and monitor vital signs and lab values.- Assess fundus and lochia.- Provide ice/sitz baths for perineum discomfort.- Monitor healing of incision/episiotomy/laceration, and assess for signs and symptoms of infection and hematoma.- Assess bladder function and monitor for bladder distention.- Provide/instruct/assist with pericare as needed.- Provide VTE prophylaxis as needed.- Monitor bowel function.- Encourage ambulation and provide assistance as needed.- Assess and monitor emotional status and provide social service/psych resources as needed.- Utilize standard precautions and use personal protective equipment as indicated. Ensure aseptic care of all intravenous lines and invasive tubes/drains.- Obtain immunization and exposure to communicable diseases history.  Outcome: Progressing  Goal: Optimize infant feeding at the breast  Description: INTERVENTIONS:- Initiate breast feeding within first hour after birth. - Monitor effectiveness of current breast feeding efforts.- Assess support systems available to mother/family.- Identify cultural beliefs/practices regarding lactation, letdown techniques, maternal food preferences.- Assess mother's knowledge and previous experience with breast feeding.- Provide information as needed about early infant feeding cues (e.g., rooting, lip smacking, sucking fingers/hand) versus late cue of crying.- Discuss/demonstrate breast feeding aids (e.g., infant sling, nursing footstool/pillows, and breast pumps).- Encourage mother/other family members to express feelings/concerns, and actively listen.- Educate father/SO about benefits of breast feeding and how to manage common lactation challenges.- Recommend avoidance of specific medications or substances incompatible with breast feeding.- Assess and monitor for signs of nipple pain/trauma.- Instruct and provide assistance  with proper latch.- Review techniques for milk expression (breast pumping) and storage of breast milk. Provide pumping equipment/supplies, instructions and assistance, as needed.- Encourage rooming-in and breast feeding on demand.- Encourage skin-to-skin contact.- Provide LC support as needed.- Assess for and manage engorgement.- Provide breast feeding education handouts and information on community breast feeding support.   Outcome: Progressing  Goal: Establishment of adequate milk supply with medication/procedure interruptions  Description: INTERVENTIONS:- Review techniques for milk expression (breast pumping). - Provide pumping equipment/supplies, instructions, and assistance until it is safe to breastfeed infant.  Outcome: Progressing  Goal: Experiences normal breast weaning course  Description: INTERVENTIONS:- Assess for and manage engorgement.- Instruct on breast care.- Provide comfort measures.  Outcome: Progressing  Goal: Appropriate maternal -  bonding  Description: INTERVENTIONS:- Assess caregiver- interactions.- Assess caregiver's emotional status and coping mechanisms.- Encourage caregiver to participate in  daily care.- Assess support systems available to mother/family.- Provide /case management support as needed.  Outcome: Progressing

## 2025-03-12 NOTE — ANESTHESIA PROCEDURE NOTES
Labor Analgesia    Date/Time: 3/12/2025 10:48 AM    Performed by: Agustin Zazueta MD  Authorized by: Agustin Zazueta MD      General Information and Staff    Start Time:  3/12/2025 10:48 AM  End Time:  3/12/2025 10:48 AM  Anesthesiologist:  Agustin Zazueta MD  Performed by:  Anesthesiologist  Patient Location:  OB  Site Identification: surface landmarks    Reason for Block: labor epidural    Preanesthetic Checklist: patient identified, IV checked, risks and benefits discussed, monitors and equipment checked, pre-op evaluation, timeout performed, IV bolus, anesthesia consent and sterile technique used      Procedure Details    Patient Position:  Sitting  Prep: ChloraPrep    Monitoring:  Heart rate and continuous pulse ox  Approach:  Midline    Epidural Needle    Injection Technique:   Needle Type:  Tuohy  Needle Gauge:  17 G  Needle Length:  3.375 in  Location:  L3-4    Spinal Needle      Catheter    Catheter Type:  End hole  Catheter Size:  19 G  Test Dose:  Negative    Assessment      Additional Comments

## 2025-03-12 NOTE — PLAN OF CARE
Problem: BIRTH - VAGINAL/ SECTION  Goal: Fetal and maternal status remain reassuring during the birth process  Description: INTERVENTIONS:- Monitor vital signs- Monitor fetal heart rate- Monitor uterine activity- Monitor labor progression (vaginal delivery)- DVT prophylaxis (C/S delivery)- Surgical antibiotic prophylaxis (C/S delivery)  Outcome: Progressing     Problem: PAIN - ADULT  Goal: Verbalizes/displays adequate comfort level or patient's stated pain goal  Description: INTERVENTIONS:- Encourage pt to monitor pain and request assistance- Assess pain using appropriate pain scale- Administer analgesics based on type and severity of pain and evaluate response- Implement non-pharmacological measures as appropriate and evaluate response- Consider cultural and social influences on pain and pain management- Manage/alleviate anxiety- Utilize distraction and/or relaxation techniques- Monitor for opioid side effects- Notify MD/LIP if interventions unsuccessful or patient reports new pain- Anticipate increased pain with activity and pre-medicate as appropriate  Outcome: Progressing     Problem: ANXIETY  Goal: Will report anxiety at manageable levels  Description: INTERVENTIONS:- Administer medication as ordered- Teach and rehearse alternative coping skills- Provide emotional support with 1:1 interaction with staff  Outcome: Progressing     Problem: Patient/Family Goals  Goal: Patient/Family Long Term Goal  Description: Patient's Long Term Goal: Safe delivery for mom and baby  Interventions:-   - See additional Care Plan goals for specific interventions  Outcome: Progressing  Goal: Patient/Family Short Term Goal  Description: Patient's Short Term Goal: Adequate pain control  Interventions: -   - See additional Care Plan goals for specific interventions  Outcome: Progressing

## 2025-03-12 NOTE — PLAN OF CARE
Problem: BIRTH - VAGINAL/ SECTION  Goal: Fetal and maternal status remain reassuring during the birth process  Description: INTERVENTIONS:- Monitor vital signs- Monitor fetal heart rate- Monitor uterine activity- Monitor labor progression (vaginal delivery)- DVT prophylaxis (C/S delivery)- Surgical antibiotic prophylaxis (C/S delivery)  3/12/2025 1418 by Daniela Jacques RN  Outcome: Completed  3/12/2025 0935 by Daniela Jacques RN  Outcome: Progressing     Problem: PAIN - ADULT  Goal: Verbalizes/displays adequate comfort level or patient's stated pain goal  Description: INTERVENTIONS:- Encourage pt to monitor pain and request assistance- Assess pain using appropriate pain scale- Administer analgesics based on type and severity of pain and evaluate response- Implement non-pharmacological measures as appropriate and evaluate response- Consider cultural and social influences on pain and pain management- Manage/alleviate anxiety- Utilize distraction and/or relaxation techniques- Monitor for opioid side effects- Notify MD/LIP if interventions unsuccessful or patient reports new pain- Anticipate increased pain with activity and pre-medicate as appropriate  3/12/2025 1418 by Daniela Jacques RN  Outcome: Completed  3/12/2025 0935 by Daniela Jacques RN  Outcome: Progressing     Problem: ANXIETY  Goal: Will report anxiety at manageable levels  Description: INTERVENTIONS:- Administer medication as ordered- Teach and rehearse alternative coping skills- Provide emotional support with 1:1 interaction with staff  3/12/2025 1418 by Daniela Jacques RN  Outcome: Completed  3/12/2025 0935 by Daniela Jacques RN  Outcome: Progressing     Problem: Patient/Family Goals  Goal: Patient/Family Long Term Goal  Description: Patient's Long Term Goal: Safe delivery for mom and baby  Interventions:-   - See additional Care Plan goals for specific interventions  3/12/2025 1418 by Daniela Jacques RN  Outcome: Completed  3/12/2025 0935 by  Daniela Jacques, RN  Outcome: Progressing  Goal: Patient/Family Short Term Goal  Description: Patient's Short Term Goal: Adequate pain control  Interventions: -   - See additional Care Plan goals for specific interventions  3/12/2025 1418 by Daniela Jacques, RN  Outcome: Completed  3/12/2025 0935 by Daniela Jacques, RN  Outcome: Progressing

## 2025-03-12 NOTE — DISCHARGE INSTRUCTIONS
Discharge instructions after vaginal delivery:    Nothing in the vagina for 6 weeks   No strenuous activity/exercise  May shower immediately. May take bath  Keep wound(s) clean and dry. Wash daily with warm water & soap. Do not scrub. Gently pat dry. May cover with clean gauze or pad if needed.     AVOID CONSTIPATION:   -Take Miralax one capful in water or juice each morning.  You can also take each evening iif needed.  -Take Fiber supplement along with Miralax as well.  -May also take milk of magnesia or Dulcolax over the counter if needing to have a BM more urgently than Miralax is providing    -Do NOT strain for bowel movements    Please call office if:  -fever 100.4 or higher    Please proceed to the Emergency Department at [facility] for any of the following:   -vaginal bleeding soaking greater than 1 pad per hour  -severe pelvic pain  -shortness of breath  -chest pain  -leg pain or swelling    FOR PAIN:  Ibuprofen 600 mg every 6 hours as needed  6:00 and 12:00    Tylenol Extra Strength 1-2 tabs every 6 hours as needed  9:00 and 3:00

## 2025-03-12 NOTE — ANESTHESIA PREPROCEDURE EVALUATION
PRE-OP EVALUATION    Patient Name: Fabiola Dempsey    Admit Diagnosis: Pregnancy (HCC) [Z34.90]    Pre-op Diagnosis: * No pre-op diagnosis entered *        Anesthesia Procedure: LABOR ANALGESIA    * No surgeons found in log *    Pre-op vitals reviewed.  Temp: 98.6 °F (37 °C)  Pulse: 87  Resp: 20  BP: 98/63  SpO2: 99 %  Body mass index is 27.1 kg/m².    Current medications reviewed.  Hospital Medications:   lactated ringers infusion   Intravenous Continuous    dextrose in lactated ringers 5% infusion   Intravenous PRN    lactated ringers IV bolus 500 mL  500 mL Intravenous PRN    acetaminophen (Tylenol Extra Strength) tab 500 mg  500 mg Oral Q6H PRN    acetaminophen (Tylenol Extra Strength) tab 1,000 mg  1,000 mg Oral Q6H PRN    ibuprofen (Motrin) tab 600 mg  600 mg Oral Once PRN    ondansetron (Zofran) 4 MG/2ML injection 4 mg  4 mg Intravenous Q6H PRN    oxyTOCIN in sodium chloride 0.9% (Pitocin) 30 Units/500mL infusion premix  62.5-900 shahnaz-units/min Intravenous Continuous    terbutaline (Brethine) 1 MG/ML injection 0.25 mg  0.25 mg Subcutaneous PRN    sodium citrate-citric acid (Bicitra) 500-334 MG/5ML oral solution 30 mL  30 mL Oral PRN    ropivacaine (Naropin) 0.5% injection  30 mL Injection PRN    oxyTOCIN in sodium chloride 0.9% (Pitocin) 30 Units/500mL infusion premix  0.5-20 shahnaz-units/min Intravenous Continuous    lactated ringers IV bolus 1,000 mL  1,000 mL Intravenous Once    fentaNYL-bupivacaine 2 mcg/mL-0.125% in sodium chloride 0.9% 100 mL EPIDURAL infusion premix  12 mL/hr Epidural Continuous    [COMPLETED] fentaNYL (Sublimaze) 50 mcg/mL injection 100 mcg  100 mcg Epidural Once    lidocaine 1.5%-EPINEPHrine 1:200,000 (Xylocaine-Epinephrine) injection  5 mL Injection PRN    bupivacaine PF (Marcaine) 0.25% injection  30 mL Injection PRN    lidocaine PF (Xylocaine-MPF) 2% injection  5 mL Injection PRN    sodium chloride 0.9% PF injection 10 mL  10 mL Injection PRN    ePHEDrine (PF) 25 MG/5 ML injection  5 mg  5 mg Intravenous PRN    nalbuphine (Nubain) 10 mg/mL injection 2.5 mg  2.5 mg Intravenous Q15 Min PRN    lidocaine 1.5%-EPINEPHrine 1:200,000 (Xylocaine-Epinephrine) injection   Epidural PRN       Outpatient Medications:   Prescriptions Prior to Admission[1]    Allergies: Patient has no known allergies.      Anesthesia Evaluation    Patient summary reviewed.    Anesthetic Complications           GI/Hepatic/Renal                                 Cardiovascular                                                       Endo/Other                                  Pulmonary                           Neuro/Psych                                      Past Surgical History:   Procedure Laterality Date    Other surgical history      wedge fingers rt hand     Social History     Socioeconomic History    Marital status:    Tobacco Use    Smoking status: Never    Smokeless tobacco: Never   Vaping Use    Vaping status: Never Used   Substance and Sexual Activity    Alcohol use: No    Drug use: No    Sexual activity: Yes     Partners: Male   Other Topics Concern    Caffeine Concern No    Exercise No    Seat Belt No    Special Diet No    Stress Concern No    Weight Concern No     History   Drug Use No     Available pre-op labs reviewed.  Lab Results   Component Value Date    WBC 8.6 03/12/2025    RBC 4.57 03/12/2025    HGB 12.8 03/12/2025    HCT 39.5 03/12/2025    MCV 86.4 03/12/2025    MCH 28.0 03/12/2025    MCHC 32.4 03/12/2025    RDW 15.4 03/12/2025    .0 03/12/2025               Airway      Mallampati: II  Mouth opening: >3 FB  TM distance: > 6 cm  Neck ROM: full Cardiovascular    Cardiovascular exam normal.         Dental             Pulmonary    Pulmonary exam normal.                 Other findings              ASA: 2   Plan: epidural             Plan/risks discussed with: patient                Present on Admission:   Pregnancy (HCC)   Anemia complicating pregnancy in third trimester ([facility])   Encounter for  induction of labor (HCC)   Post-term pregnancy, 40-42 weeks of gestation (HCC)             [1]   Medications Prior to Admission   Medication Sig Dispense Refill Last Dose/Taking    Prenatal Vit-Fe Sulfate-FA (PRENATAL VITAMIN OR) Take by mouth.   Past Week

## 2025-03-12 NOTE — PROGRESS NOTES
Pt is a 34 year old female admitted to 114/114-A.     Chief Complaint   Patient presents with    Scheduled Induction      Pt is  40w0d intra-uterine pregnancy.  History obtained, consents signed. Oriented to room, staff, and plan of care.
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
## 2025-03-12 NOTE — PROGRESS NOTES
Pt transferred to MB unit per LIP order.  Pt transferred via wheelchair, in stable condition, with all personal belongings and accompanied by RN and holding Baby Stratulat.  Report given to MB RN.

## 2025-03-13 VITALS
RESPIRATION RATE: 16 BRPM | WEIGHT: 153 LBS | HEART RATE: 46 BPM | BODY MASS INDEX: 27.11 KG/M2 | DIASTOLIC BLOOD PRESSURE: 54 MMHG | TEMPERATURE: 98 F | OXYGEN SATURATION: 99 % | HEIGHT: 63 IN | SYSTOLIC BLOOD PRESSURE: 100 MMHG

## 2025-03-13 LAB
BASOPHILS # BLD AUTO: 0.03 X10(3) UL (ref 0–0.2)
BASOPHILS NFR BLD AUTO: 0.3 %
EOSINOPHIL # BLD AUTO: 0.07 X10(3) UL (ref 0–0.7)
EOSINOPHIL NFR BLD AUTO: 0.8 %
ERYTHROCYTE [DISTWIDTH] IN BLOOD BY AUTOMATED COUNT: 15.6 %
HCT VFR BLD AUTO: 33.5 %
HGB BLD-MCNC: 11.1 G/DL
IMM GRANULOCYTES # BLD AUTO: 0.08 X10(3) UL (ref 0–1)
IMM GRANULOCYTES NFR BLD: 0.9 %
LYMPHOCYTES # BLD AUTO: 1.6 X10(3) UL (ref 1–4)
LYMPHOCYTES NFR BLD AUTO: 18.1 %
MCH RBC QN AUTO: 28.1 PG (ref 26–34)
MCHC RBC AUTO-ENTMCNC: 33.1 G/DL (ref 31–37)
MCV RBC AUTO: 84.8 FL
MONOCYTES # BLD AUTO: 0.71 X10(3) UL (ref 0.1–1)
MONOCYTES NFR BLD AUTO: 8 %
NEUTROPHILS # BLD AUTO: 6.36 X10 (3) UL (ref 1.5–7.7)
NEUTROPHILS # BLD AUTO: 6.36 X10(3) UL (ref 1.5–7.7)
NEUTROPHILS NFR BLD AUTO: 71.9 %
PLATELET # BLD AUTO: 182 10(3)UL (ref 150–450)
RBC # BLD AUTO: 3.95 X10(6)UL
WBC # BLD AUTO: 8.9 X10(3) UL (ref 4–11)

## 2025-03-13 NOTE — PROGRESS NOTES
PPD#1  Patient has no complaints, minimal lochia,     /54 (BP Location: Right arm)   Pulse (!) 46   Temp 98 °F (36.7 °C) (Oral)   Resp 16   Ht 63\"   Wt 153 lb (69.4 kg)   LMP 2024   SpO2 99%   Breastfeeding Yes   BMI 27.10 kg/m²     Recent Labs   Lab 25  0852 25  0624   RBC 4.57 3.95   HGB 12.8 11.1*   HCT 39.5 33.5*   MCV 86.4 84.8   MCH 28.0 28.1   MCHC 32.4 33.1   RDW 15.4 15.6   NEPRELIM 6.37 6.36   WBC 8.6 8.9   .0 182.0       Abdomen: soft, NT/ND  Uterus: firm, below umbilicus    A/P: s/p   - home today

## 2025-03-13 NOTE — PROGRESS NOTES
Labor Analgesia Follow Up Note    Patient underwent epidural anesthesia for labor analgesia,    Placenta Date/Time: 3/12/2025  1:26 PM    Delivery Date/Time:: 3/12/2025  1:22 PM    /54 (BP Location: Right arm)   Pulse (!) 46   Temp 98 °F (36.7 °C) (Oral)   Resp 16   Ht 1.6 m (5' 3\")   Wt 69.4 kg (153 lb)   LMP 06/05/2024   SpO2 99%   Breastfeeding Yes   BMI 27.10 kg/m²     Assessment:  Patient seen and no apparent anesthesia related complications.    Thank you for asking us to participate in the care of your patient.

## 2025-03-15 ENCOUNTER — TELEPHONE (OUTPATIENT)
Dept: OBGYN UNIT | Facility: HOSPITAL | Age: 35
End: 2025-03-15

## 2025-03-16 ENCOUNTER — TELEPHONE (OUTPATIENT)
Dept: OBGYN UNIT | Facility: HOSPITAL | Age: 35
End: 2025-03-16

## 2025-04-24 ENCOUNTER — POSTPARTUM (OUTPATIENT)
Facility: CLINIC | Age: 35
End: 2025-04-24
Payer: MEDICAID

## 2025-04-24 VITALS
SYSTOLIC BLOOD PRESSURE: 114 MMHG | DIASTOLIC BLOOD PRESSURE: 66 MMHG | WEIGHT: 134.19 LBS | BODY MASS INDEX: 24 KG/M2 | HEART RATE: 97 BPM

## 2025-04-24 DIAGNOSIS — Z13.0 SCREENING, ANEMIA, DEFICIENCY, IRON: ICD-10-CM

## 2025-04-24 DIAGNOSIS — Z30.09 GENERAL COUNSELING AND ADVICE FOR CONTRACEPTIVE MANAGEMENT: ICD-10-CM

## 2025-04-24 DIAGNOSIS — Z13.29 SCREENING FOR THYROID DISORDER: ICD-10-CM

## 2025-04-24 DIAGNOSIS — Z71.85 HPV VACCINE COUNSELING: ICD-10-CM

## 2025-04-24 DIAGNOSIS — Z13.220 SCREENING FOR LIPID DISORDERS: ICD-10-CM

## 2025-04-24 DIAGNOSIS — Z13.1 SCREENING FOR DIABETES MELLITUS: ICD-10-CM

## 2025-04-24 PROBLEM — O99.013 ANEMIA COMPLICATING PREGNANCY IN THIRD TRIMESTER (HCC): Status: RESOLVED | Noted: 2025-01-16 | Resolved: 2025-04-24

## 2025-04-24 PROBLEM — Z34.90 ENCOUNTER FOR INDUCTION OF LABOR (HCC): Status: RESOLVED | Noted: 2025-03-12 | Resolved: 2025-04-24

## 2025-04-24 PROBLEM — O09.33 LATE PRENATAL CARE AFFECTING PREGNANCY IN THIRD TRIMESTER (HCC): Status: RESOLVED | Noted: 2024-10-17 | Resolved: 2025-04-24

## 2025-04-24 PROBLEM — O48.0 POST-TERM PREGNANCY, 40-42 WEEKS OF GESTATION (HCC): Status: RESOLVED | Noted: 2025-03-12 | Resolved: 2025-04-24
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
## 2025-04-24 NOTE — PATIENT INSTRUCTIONS
Well woman exam in about 3-6 months please.   Please continue folic acid.       Please establish care with a primary care physician if you do not already have one.     To establish care with a primary care physician or specialist, please call the [facility] Physician Referral line at [phone] or visit [email or web address]       Intrauterine device (IUD) basics:    There are 5 IUDs on the market currently. Four of these are hormonal & one is non-hormonal.     Hormonal IUDs:  There are 4 IUDs on the market that contain the progestin (progesterone like substance) levonorgestrel. They are:  Mirena (8 years for contraception, 5 years for heavy bleeding) or Liletta (6 years)   -52 mcg levonorgestrel  -both of these have the same dosing, but a different . Liletta's maker is studying it continuously to keep trying to get it approved for longer & longer use. We don't usually stock Liletta in our office, but if that is the one you want, we can send a prescription to your pharmacy for one & you can bring it into the office to have us insert it. About 50% of women at 1 year will have no more periods.   -[email or web address]  -[email or web address]    Kyleena (5 years)  -19.5 mg levonorgestrel  -medium dose, slightly smaller frame than above. More likely to have periods  -[email or web address]    Larry (3 years)  -13.5 mg levonorgestrel  -lowest dose, smallest frame. Most likely to have periods on this.   -[email or web address]    Non-hormonal IUD:   Paragard (10 years)  -copper only  -may cause heavier or more painful cramps      Considerations:   -Usually women with more significant period cramping & bleeding will do better with a higher dose of progestin.   -Women with endometriosis often have progestin resistance, meaning a higher than expected dose of progestin is needed to suppress the symptoms.     IUD insertion tips:  The best time to have an IUD inserted  is when you are on your period (Cycle day 1-5 is best. Cycle day 1 is the first day of bleeding (not spotting)) for a few reasons:  (1) You should not be pregnant while you are menstruating. A urine pregnancy test should always be performed prior to IUD insertion because bleeding can occur during early pregnancy.   (2) You are shedding last month's lining and a new lining is just starting to form, so it can be exposed to the progestin right away to keep it thin and control the synchronization of its growth  (3) While you are menstruating, the cervix is naturally slightly more dilated, which can make the IUD insertion easier.     You can take ibuprofen 600 mg (3 of the over the counter 200 mg tablets) about 2 hours prior to your IUD insertion to help ease cramping of the insertion.    For more information regarding contraception & women's health:  [email or web address]://[email or web address]/[email or web address]-health/healthy-[email or web address]/birth-control  https://www.[facility]renthood.org/learn/birth-control  https://www.[employer]sider.org/      Patient Information about GARDASIL®9 (pronounced “jacw-Hi-pjqr n?n”)  (Human Papillomavirus 9-valent Vaccine, Recombinant)    IT IS RECOMMENDED THAT YOU CHECK WITH YOUR INSURANCE ABOUT COVERAGE FOR THIS VACCINE PRIOR TO ITS ADMINISTRATION    Read this information with care before getting GARDASIL®9. You or your child (the person getting  GARDASIL 9) will need 2 or 3 doses of the vaccine, depending on how old you are. It is important to read  this information before getting each dose. This information does not take the place of talking with your  health care professional about GARDASIL 9.    What is GARDASIL 9?    GARDASIL 9 is a vaccine (injection/shot) given to individuals 9 through 45 years of age to help protect  against diseases caused by some types of Human Papillomavirus (HPV).    What diseases can GARDASIL 9 help protect against?    In girls and women 9 through 45 years of age, GARDASIL 9 helps protect against:  ?  Cervical cancer  ? Vulvar and vaginal cancers  ? Anal cancer  ? Certain head and neck cancers, such as throat and back of mouth cancers  ? Precancerous cervical, vulvar, vaginal and anal lesions  ? Genital warts  In boys and men 9 through 45 years of age, GARDASIL 9 helps protect against:  ? Anal cancer  ? Certain head and neck cancers, such as throat and back of mouth cancers  ? Precancerous anal lesions  ? Genital warts    These diseases may have many causes, including HPV Types 6, 11, 16, 18, 31, 33, 45, 52, and 58.  GARDASIL 9 only protects against diseases caused by these nine types of HPV.  People cannot get HPV or any of these diseases from GARDASIL 9.    What important information about GARDASIL 9 should I know?    GARDASIL 9:  ? Does not remove the need for screening for cervical, vulvar, vaginal, anal, and certain head and  neck cancers, such as throat and back of mouth cancers as recommended by a health care  professional; women should still get routine cervical cancer screening.  ? Does not protect the person getting GARDASIL 9 from a disease that is caused by other types of  HPV, other viruses or bacteria.  ? Does not treat HPV infection.  ? Does not protect the person getting GARDASIL 9 from HPV types that he/she may already have.  GARDASIL 9 may not fully protect each person who gets it.    Who should not get GARDASIL 9?    Anyone with an allergic reaction to:  ? A previous dose of GARDASIL 9  ? A previous dose of GARDASIL®  ? Yeast (severe allergic reaction)  ? Amorphous aluminum hydroxyphosphate sulfate  ? Polysorbate 80    What should I tell the health care professional before getting GARDASIL 9?    Tell the health care professional if you or your child (the person getting GARDASIL 9):  ? Are pregnant or planning to get pregnant.  ? Have immune problems, like HIV or cancer.  ? Take medicines that affect the immune system.  ? Have a fever over 100°F (37.8°C).  ? Might have had an allergic reaction  to a previous dose of GARDASIL 9 or GARDASIL.  ? Take any medicines, even those you can buy over the counter.  The health care professional will help decide if you or your child should get the vaccine.    How is GARDASIL 9 given?    GARDASIL 9 is a shot that is usually given in the arm muscle. GARDASIL 9 may be given as 2 or 3  shots.  For persons who are   9 through 14 years old   2-shots* Dose 1: first shot  Dose 2: second shot given between 6 and 12 months  after the first shot    or 3-shots** Dose 1: first shot  Dose 2: second shot given 2 months after the first shot  Dose 3: third shot given 6 months after the first shot    15 through 45 years old 3-shots   Dose 1: first shot  Dose 2: second shot given 2 months after the first shot  Dose 3: third shot given 6 months after the first shot    *If the second shot is given earlier than 5 months after the first shot, you will need to get a third shot at  least 4 months after the second shot was given.    **The need to use a 3-dose schedule instead of a 2-dose schedule will be determined by your health care  Professional.    Make sure that you or your child gets all doses recommended by your health care professional so that  you or your child gets the best protection. If the person getting GARDASIL 9 misses a dose, tell the health  care professional and they will decide when to give the missed dose. It is important that you follow the  instructions of your health care professional regarding return visits for the follow-up doses.  Fainting can happen after getting an HPV vaccine. Sometimes people who faint can fall and hurt  themselves. For this reason, the health care professional may ask the person getting GARDASIL 9 to sit  or lie down for 15 minutes after getting the vaccine. Some people who faint might shake or become stiff.  The health care professional may need to treat the person getting GARDASIL 9.    Can I get GARDASIL 9 if I have already gotten  GARDASIL?    If you have already gotten GARDASIL, talk to your health care professional to see if GARDASIL 9 is right  for you.    Can I get GARDASIL 9 with other vaccines?    GARDASIL 9 can be given at the same time as:  ? Menactra [Meningococcal (Groups A, C, Y and W-135) Polysaccharide Diphtheria Toxoid  Conjugate Vaccine]  ? Adacel [Tetanus Toxoid, Reduced Diphtheria Toxoid and Acellular Pertussis Vaccine Adsorbed  (Tdap)]    What are the possible side effects of GARDASIL 9?    The most common side effects seen with GARDASIL 9 are:  ? pain, swelling, redness, itching, bruising, bleeding, and a lump where you got the shot  ? headache  ? fever  ? nausea  ? dizziness  ? tiredness  ? diarrhea  ? abdominal pain  ? sore throat    Studies show that there was more swelling where the shot was given when GARDASIL 9 was given at  the same time as Menactra and/or Adacel.    Tell the health care professional if you have any of these problems because these may be signs of an  allergic reaction:  ? difficulty breathing  ? wheezing (bronchospasm)  ? hives  ? rash    Additional side effects that have been reported during general use for GARDASIL 9 are shown below.  Side effects reported during the general use of GARDASIL are also shown below. GARDASIL side effects  are reported as they may be relevant to GARDASIL 9 since the vaccines are similar in composition.  GARDASIL 9  ? vomiting  ? hives    Additionally, these side effects have been seen with the general use of GARDASIL.  ? swollen glands (neck, armpit, or groin)  ? joint pain  ? unusual tiredness, weakness, or confusion  ? chills  ? generally feeling unwell  ? leg pain  ? shortness of breath  ? chest pain  ? aching muscles  ? muscle weakness  ? seizure  ? bad stomach ache  ? bleeding or bruising more easily than normal  ? skin infection    You should contact your health care professional right away if you get any symptoms that bother you.  For a more complete list of side  effects, ask the health care professional.    Call your health care professional for medical advice about side effects. You may also report any side  effects to your doctor or directly to Vaccine Adverse Event Reporting System (VAERS). The [employer] tollfr number is 1-606.961.1766 or report online to www.vaers.[facility].gov.    GARDASIL 9 was not studied in women who knew they were pregnant. A pregnancy registry is available.  You are encouraged to contact the registry as soon as you become aware of your pregnancy by calling 1- [phone], or ask your health care professional to contact the registry for you.    What is in GARDASIL 9?    GARDASIL 9 contains:  ? Proteins of HPV Types 6, 11, 16, 18, 31, 33, 45, 52, and 58  ? Amorphous aluminum hydroxyphosphate sulfate  ? Yeast protein  ? Sodium chloride  ? L-histidine  ? Polysorbate 80  ? Sodium borate  ? Water    This document is a summary of information about GARDASIL 9.    To learn more about GARDASIL 9, please talk to the health care professional or visit  [email or web address].    For patent information: [email or web address]/home.html  The trademarks depicted herein are owned by their respective companies.  Copyright © 8034-4394 [employer] a subsidiary of [employer]  All rights reserved.  Revised: 06/2020  [id number]

## 2025-08-28 ENCOUNTER — TELEPHONE (OUTPATIENT)
Facility: CLINIC | Age: 35
End: 2025-08-28

## (undated) NOTE — MR AVS SNAPSHOT
After Visit Summary   9/7/2021    Teresa Rod    MRN: [id number]           Visit Information     Date & Time  9/7/2021  8:30 AM Provider  Varinder García [facility] [place]  [address]  Dept.  Phone  [address], [address] [place], please visit [email or web address]                   DO YOU KNOW WHERE TO GO? Injury & Illness are never convenient. If you are dealing with a   non-emergency, consider your options before heading to an ER.   VIDEO VISIT [employer]/[facility] or call [phone]. ([phone])

## (undated) NOTE — LETTER
Dear new mom:    We've missed you! The nurses of [facility] have tried to reach you by phone to ask if you had any questions regarding your health or the care of your new little one.     Please feel free to call your doctor with an

## (undated) NOTE — Clinical Note
I had the pleasure of seeing Melissa Benavidez on 6/14/2023. Please see my attached note.   Kerri Gruber MD FACS [facility]Surgery